# Patient Record
Sex: FEMALE | Race: WHITE | Employment: FULL TIME | URBAN - METROPOLITAN AREA
[De-identification: names, ages, dates, MRNs, and addresses within clinical notes are randomized per-mention and may not be internally consistent; named-entity substitution may affect disease eponyms.]

---

## 2017-02-08 ENCOUNTER — LAB ENCOUNTER (OUTPATIENT)
Dept: LAB | Age: 42
End: 2017-02-08
Attending: FAMILY MEDICINE
Payer: COMMERCIAL

## 2017-02-08 ENCOUNTER — OFFICE VISIT (OUTPATIENT)
Dept: FAMILY MEDICINE CLINIC | Facility: CLINIC | Age: 42
End: 2017-02-08

## 2017-02-08 VITALS
HEART RATE: 80 BPM | TEMPERATURE: 99 F | BODY MASS INDEX: 32.69 KG/M2 | WEIGHT: 186.81 LBS | HEIGHT: 63.5 IN | SYSTOLIC BLOOD PRESSURE: 118 MMHG | DIASTOLIC BLOOD PRESSURE: 80 MMHG | RESPIRATION RATE: 16 BRPM

## 2017-02-08 DIAGNOSIS — R10.2 PELVIC PAIN: ICD-10-CM

## 2017-02-08 DIAGNOSIS — E87.6 HYPOKALEMIA: Primary | ICD-10-CM

## 2017-02-08 DIAGNOSIS — Z00.00 ANNUAL PHYSICAL EXAM: Primary | ICD-10-CM

## 2017-02-08 DIAGNOSIS — B37.9 YEAST INFECTION: ICD-10-CM

## 2017-02-08 DIAGNOSIS — I10 BENIGN ESSENTIAL HYPERTENSION: ICD-10-CM

## 2017-02-08 DIAGNOSIS — E78.00 PURE HYPERCHOLESTEROLEMIA: ICD-10-CM

## 2017-02-08 PROBLEM — D25.9 UTERINE FIBROID: Status: ACTIVE | Noted: 2017-02-08

## 2017-02-08 LAB
ALBUMIN SERPL-MCNC: 3.6 G/DL (ref 3.5–4.8)
ALP LIVER SERPL-CCNC: 60 U/L (ref 37–98)
ALT SERPL-CCNC: 18 U/L (ref 14–54)
AST SERPL-CCNC: 16 U/L (ref 15–41)
BASOPHILS # BLD AUTO: 0.03 X10(3) UL (ref 0–0.1)
BASOPHILS NFR BLD AUTO: 0.7 %
BILIRUB SERPL-MCNC: 0.4 MG/DL (ref 0.1–2)
BILIRUB UR QL STRIP.AUTO: NEGATIVE
BUN BLD-MCNC: 15 MG/DL (ref 8–20)
CALCIUM BLD-MCNC: 8.3 MG/DL (ref 8.3–10.3)
CHLORIDE: 100 MMOL/L (ref 101–111)
CHOLEST SMN-MCNC: 160 MG/DL (ref ?–200)
CK: 136 IU/L (ref 26–192)
CLARITY UR REFRACT.AUTO: CLEAR
CO2: 31 MMOL/L (ref 22–32)
COLOR UR AUTO: YELLOW
CREAT BLD-MCNC: 0.76 MG/DL (ref 0.55–1.02)
EOSINOPHIL # BLD AUTO: 0.16 X10(3) UL (ref 0–0.3)
EOSINOPHIL NFR BLD AUTO: 3.8 %
ERYTHROCYTE [DISTWIDTH] IN BLOOD BY AUTOMATED COUNT: 11.9 % (ref 11.5–16)
GLUCOSE BLD-MCNC: 87 MG/DL (ref 70–99)
GLUCOSE UR STRIP.AUTO-MCNC: NEGATIVE MG/DL
HCT VFR BLD AUTO: 40.9 % (ref 34–50)
HDLC SERPL-MCNC: 69 MG/DL (ref 45–?)
HDLC SERPL: 2.32 {RATIO} (ref ?–4.44)
HGB BLD-MCNC: 13.5 G/DL (ref 12–16)
IMMATURE GRANULOCYTE COUNT: 0 X10(3) UL (ref 0–1)
IMMATURE GRANULOCYTE RATIO %: 0 %
KETONES UR STRIP.AUTO-MCNC: NEGATIVE MG/DL
LDLC SERPL CALC-MCNC: 80 MG/DL (ref ?–130)
LEUKOCYTE ESTERASE UR QL STRIP.AUTO: NEGATIVE
LYMPHOCYTES # BLD AUTO: 1.44 X10(3) UL (ref 0.9–4)
LYMPHOCYTES NFR BLD AUTO: 34.6 %
M PROTEIN MFR SERPL ELPH: 7 G/DL (ref 6.1–8.3)
MCH RBC QN AUTO: 31 PG (ref 27–33.2)
MCHC RBC AUTO-ENTMCNC: 33 G/DL (ref 31–37)
MCV RBC AUTO: 94 FL (ref 81–100)
MONOCYTES # BLD AUTO: 0.32 X10(3) UL (ref 0.1–0.6)
MONOCYTES NFR BLD AUTO: 7.7 %
NEUTROPHIL ABS PRELIM: 2.21 X10 (3) UL (ref 1.3–6.7)
NEUTROPHILS # BLD AUTO: 2.21 X10(3) UL (ref 1.3–6.7)
NEUTROPHILS NFR BLD AUTO: 53.2 %
NITRITE UR QL STRIP.AUTO: NEGATIVE
NONHDLC SERPL-MCNC: 91 MG/DL (ref ?–130)
PH UR STRIP.AUTO: 6 [PH] (ref 4.5–8)
PLATELET # BLD AUTO: 277 10(3)UL (ref 150–450)
POTASSIUM SERPL-SCNC: 3.1 MMOL/L (ref 3.6–5.1)
PROT UR STRIP.AUTO-MCNC: NEGATIVE MG/DL
RBC # BLD AUTO: 4.35 X10(6)UL (ref 3.8–5.1)
RED CELL DISTRIBUTION WIDTH-SD: 41.1 FL (ref 35.1–46.3)
SODIUM SERPL-SCNC: 137 MMOL/L (ref 136–144)
SP GR UR STRIP.AUTO: 1.01 (ref 1–1.03)
TRIGLYCERIDES: 55 MG/DL (ref ?–150)
TSI SER-ACNC: 2.94 MIU/ML (ref 0.35–5.5)
UROBILINOGEN UR STRIP.AUTO-MCNC: <2 MG/DL
VLDL: 11 MG/DL (ref 5–40)
WBC # BLD AUTO: 4.2 X10(3) UL (ref 4–13)

## 2017-02-08 PROCEDURE — 80061 LIPID PANEL: CPT

## 2017-02-08 PROCEDURE — 82550 ASSAY OF CK (CPK): CPT

## 2017-02-08 PROCEDURE — G0101 CA SCREEN;PELVIC/BREAST EXAM: HCPCS | Performed by: FAMILY MEDICINE

## 2017-02-08 PROCEDURE — 81001 URINALYSIS AUTO W/SCOPE: CPT

## 2017-02-08 PROCEDURE — 85025 COMPLETE CBC W/AUTO DIFF WBC: CPT

## 2017-02-08 PROCEDURE — 99396 PREV VISIT EST AGE 40-64: CPT | Performed by: FAMILY MEDICINE

## 2017-02-08 PROCEDURE — 80053 COMPREHEN METABOLIC PANEL: CPT

## 2017-02-08 PROCEDURE — 84443 ASSAY THYROID STIM HORMONE: CPT

## 2017-02-08 PROCEDURE — 99213 OFFICE O/P EST LOW 20 MIN: CPT | Performed by: FAMILY MEDICINE

## 2017-02-08 NOTE — PROGRESS NOTES
CC: Annual Physical Exam    HPI:   Florinda Cortez is a 42year old female who presents for a complete physical exam. Symptoms: has significant cramping, has significant bloating, does not get menses.  Patient notes hx uterine ablation for heavy cycles  Pt wi Cigarettes      Quit date: 02/01/2004    Alcohol Use: Yes           0.0 oz/week       0 Standard drinks or equivalent per week       Comment: 1 drink -1-2 times per wk-  wine or beer    Drug Use: No            Other Topics            Concern  Caffeine Conc °F (36.9 °C) (Tympanic)  Resp 16  Ht 63.5\"  Wt 186 lb 13 oz  BMI 32.57 kg/m2 Estimated body mass index is 32.57 kg/(m^2) as calculated from the following:    Height as of this encounter: 63.5\". Weight as of this encounter: 186 lb 13 oz.    Vital signs Metabolic Panel (14) [E]  CK (Creatine Kinase) (Not Creatinine) (E)  Lipid Panel [E]  TSH W Reflex To Free T4 [E]  UA/M With Culture Reflex [E]    Patient Instructions   Fasting labs today    rec pelvic ultrasound to eval fibroids and pelvic pain    rec Mo

## 2017-02-08 NOTE — PATIENT INSTRUCTIONS
Fasting labs today    rec pelvic ultrasound to eval fibroids and pelvic pain    rec Monistat- dual pack    Continue meds    Continue mammogram f.u

## 2017-02-09 ENCOUNTER — TELEPHONE (OUTPATIENT)
Dept: FAMILY MEDICINE CLINIC | Facility: CLINIC | Age: 42
End: 2017-02-09

## 2017-02-09 RX ORDER — POTASSIUM CHLORIDE 1500 MG/1
1 TABLET, FILM COATED, EXTENDED RELEASE ORAL DAILY
Qty: 60 TABLET | Refills: 0 | Status: SHIPPED | OUTPATIENT
Start: 2017-02-09 | End: 2017-04-11

## 2017-02-09 NOTE — TELEPHONE ENCOUNTER
----- Message from Yasmeen Machado MD sent at 2/8/2017  9:26 PM CST -----  Labs reviewed  All appear normal with exception of LOW POTASIUM> Pt on HCTZ which can cause this.  I rec she start taking KCL suppliment daily and recheck basic chem panel in 4-6

## 2017-02-14 ENCOUNTER — TELEPHONE (OUTPATIENT)
Dept: FAMILY MEDICINE CLINIC | Facility: CLINIC | Age: 42
End: 2017-02-14

## 2017-02-14 ENCOUNTER — HOSPITAL ENCOUNTER (OUTPATIENT)
Dept: ULTRASOUND IMAGING | Age: 42
Discharge: HOME | End: 2017-02-14
Attending: FAMILY MEDICINE
Payer: COMMERCIAL

## 2017-02-14 DIAGNOSIS — E78.00 PURE HYPERCHOLESTEROLEMIA: ICD-10-CM

## 2017-02-14 DIAGNOSIS — R10.2 PELVIC PAIN: ICD-10-CM

## 2017-02-14 PROCEDURE — 76856 US EXAM PELVIC COMPLETE: CPT

## 2017-02-14 PROCEDURE — 76830 TRANSVAGINAL US NON-OB: CPT

## 2017-02-14 NOTE — TELEPHONE ENCOUNTER
----- Message from Merlinda Deacon, MD sent at 2/14/2017  4:33 PM CST -----  Reviewed. Uterine fibroid noted. Pt rec to consult with gyne if pelvic pain persists. Endometrial lining very thin s/p ablation.

## 2017-02-25 RX ORDER — ATORVASTATIN CALCIUM 10 MG/1
TABLET, FILM COATED ORAL
Qty: 90 TABLET | Refills: 3 | Status: SHIPPED | OUTPATIENT
Start: 2017-02-25 | End: 2018-03-11

## 2017-04-10 ENCOUNTER — TELEPHONE (OUTPATIENT)
Dept: FAMILY MEDICINE CLINIC | Facility: CLINIC | Age: 42
End: 2017-04-10

## 2017-04-10 DIAGNOSIS — E87.6 HYPOKALEMIA: Primary | ICD-10-CM

## 2017-04-11 NOTE — TELEPHONE ENCOUNTER
Pt had labs done on 2/8- had low Potassium level. Pt was started on supplement at that time-RX was issued. Pt states she will be out of Potassium on Friday- is scheduled for labs on 4/19. Pt was asked to repeat chem in 4-6 wks.   Will need lab order pleas

## 2017-04-12 RX ORDER — POTASSIUM CHLORIDE 1500 MG/1
1 TABLET, FILM COATED, EXTENDED RELEASE ORAL DAILY
Qty: 60 TABLET | Refills: 0 | Status: SHIPPED | OUTPATIENT
Start: 2017-04-12 | End: 2017-06-11

## 2017-04-19 ENCOUNTER — APPOINTMENT (OUTPATIENT)
Dept: LAB | Age: 42
End: 2017-04-19
Attending: FAMILY MEDICINE
Payer: COMMERCIAL

## 2017-04-19 DIAGNOSIS — E87.6 HYPOKALEMIA: ICD-10-CM

## 2017-04-19 PROCEDURE — 80048 BASIC METABOLIC PNL TOTAL CA: CPT | Performed by: FAMILY MEDICINE

## 2017-06-12 RX ORDER — HYDROCHLOROTHIAZIDE 25 MG/1
TABLET ORAL
Qty: 90 TABLET | Refills: 2 | Status: SHIPPED | OUTPATIENT
Start: 2017-06-12 | End: 2018-04-10

## 2017-06-12 RX ORDER — POTASSIUM CHLORIDE 1500 MG/1
TABLET, FILM COATED, EXTENDED RELEASE ORAL
Qty: 60 TABLET | Refills: 2 | Status: SHIPPED | OUTPATIENT
Start: 2017-06-12 | End: 2018-02-05

## 2017-10-18 ENCOUNTER — OFFICE VISIT (OUTPATIENT)
Dept: FAMILY MEDICINE CLINIC | Facility: CLINIC | Age: 42
End: 2017-10-18

## 2017-10-18 VITALS
TEMPERATURE: 98 F | WEIGHT: 184.5 LBS | DIASTOLIC BLOOD PRESSURE: 82 MMHG | BODY MASS INDEX: 32.29 KG/M2 | HEART RATE: 64 BPM | HEIGHT: 63.5 IN | RESPIRATION RATE: 16 BRPM | SYSTOLIC BLOOD PRESSURE: 134 MMHG

## 2017-10-18 DIAGNOSIS — I10 BENIGN ESSENTIAL HYPERTENSION: ICD-10-CM

## 2017-10-18 DIAGNOSIS — Z00.00 ANNUAL PHYSICAL EXAM: Primary | ICD-10-CM

## 2017-10-18 DIAGNOSIS — E78.00 PURE HYPERCHOLESTEROLEMIA: ICD-10-CM

## 2017-10-18 PROBLEM — E87.6 HYPOKALEMIA: Status: ACTIVE | Noted: 2017-10-18

## 2017-10-18 PROCEDURE — 99396 PREV VISIT EST AGE 40-64: CPT | Performed by: FAMILY MEDICINE

## 2017-10-18 NOTE — PROGRESS NOTES
CC: Annual Physical Exam    HPI:   Nelia Howell is a 36year old female who presents for a complete physical exam.    . Patient here for her annual physical she is generally healthy and doing well her weight is down a pound and a half or so.   Patient went LUMPECTOMY LEFT   Family History   Problem Relation Age of Onset   • Cancer Father 39     breast cancer   • Hypertension Mother       Social History:   Social History    Marital status:              Spouse name:                       Years of educat abdominal pain, nausea, vomiting, constipation, diarrhea, or blood in stool. MUSCULOSKELETAL:  Denies weakness, muscle aches, back pain, joint pain, swelling or stiffness.   NEUROLOGICAL:  Denies headache, seizures, dizziness, syncope, paralysis, ataxia, n no hepatosplenomegaly. BACK: No tenderness, no spasm, SLR test negative, FROM. : Deferred   EXTREMITIES:  No edema, no cyanosis, no clubbing, FROM, 2+ dorsalis pedis pulses bilaterally.   NEURO:  No deficit, normal gait, strength and tone, sensory intac

## 2017-10-18 NOTE — PATIENT INSTRUCTIONS
Flu vaccine as planned next week    Continue meds    Return for fasting labs    Mammogram    Home stool screen

## 2017-10-29 PROCEDURE — 82272 OCCULT BLD FECES 1-3 TESTS: CPT | Performed by: FAMILY MEDICINE

## 2017-10-31 ENCOUNTER — LABORATORY ENCOUNTER (OUTPATIENT)
Dept: LAB | Age: 42
End: 2017-10-31
Attending: FAMILY MEDICINE
Payer: COMMERCIAL

## 2017-10-31 DIAGNOSIS — I10 BENIGN ESSENTIAL HYPERTENSION: ICD-10-CM

## 2017-10-31 DIAGNOSIS — E78.00 PURE HYPERCHOLESTEROLEMIA: ICD-10-CM

## 2017-10-31 PROCEDURE — 80061 LIPID PANEL: CPT | Performed by: FAMILY MEDICINE

## 2017-10-31 PROCEDURE — 36415 COLL VENOUS BLD VENIPUNCTURE: CPT | Performed by: FAMILY MEDICINE

## 2017-10-31 PROCEDURE — 80053 COMPREHEN METABOLIC PANEL: CPT | Performed by: FAMILY MEDICINE

## 2017-11-01 ENCOUNTER — TELEPHONE (OUTPATIENT)
Dept: FAMILY MEDICINE CLINIC | Facility: CLINIC | Age: 42
End: 2017-11-01

## 2017-11-01 DIAGNOSIS — E87.6 HYPOKALEMIA: Primary | ICD-10-CM

## 2017-11-01 NOTE — TELEPHONE ENCOUNTER
Pt informed, pt has been forgetting to take her Potassium, is missing 1-2 doses per week. Pt states she will do better, and would like to proceed with plan to repeat chem in one month. Order for repeat started.

## 2017-11-01 NOTE — TELEPHONE ENCOUNTER
----- Message from Bartolo Henry MD sent at 10/31/2017  8:29 PM CDT -----  Labs with low potassium, lipids ok.  Please check if pt taking potassium prescription- if so will need to increse to 40 meq, if not taking- should do so, recheck basic chem pane

## 2017-11-14 ENCOUNTER — TELEPHONE (OUTPATIENT)
Dept: FAMILY MEDICINE CLINIC | Facility: CLINIC | Age: 42
End: 2017-11-14

## 2017-11-14 DIAGNOSIS — Z00.00 ANNUAL PHYSICAL EXAM: Primary | ICD-10-CM

## 2018-01-09 ENCOUNTER — APPOINTMENT (OUTPATIENT)
Dept: LAB | Age: 43
End: 2018-01-09
Attending: FAMILY MEDICINE
Payer: COMMERCIAL

## 2018-01-09 DIAGNOSIS — E87.6 HYPOKALEMIA: ICD-10-CM

## 2018-01-09 LAB
ALBUMIN SERPL-MCNC: 3.7 G/DL (ref 3.5–4.8)
ALP LIVER SERPL-CCNC: 64 U/L (ref 37–98)
ALT SERPL-CCNC: 29 U/L (ref 14–54)
AST SERPL-CCNC: 20 U/L (ref 15–41)
BILIRUB SERPL-MCNC: 0.5 MG/DL (ref 0.1–2)
BUN BLD-MCNC: 17 MG/DL (ref 8–20)
CALCIUM BLD-MCNC: 8.9 MG/DL (ref 8.3–10.3)
CHLORIDE: 104 MMOL/L (ref 101–111)
CO2: 29 MMOL/L (ref 22–32)
CREAT BLD-MCNC: 0.76 MG/DL (ref 0.55–1.02)
GLUCOSE BLD-MCNC: 81 MG/DL (ref 70–99)
M PROTEIN MFR SERPL ELPH: 7.4 G/DL (ref 6.1–8.3)
POTASSIUM SERPL-SCNC: 4 MMOL/L (ref 3.6–5.1)
SODIUM SERPL-SCNC: 139 MMOL/L (ref 136–144)

## 2018-01-09 PROCEDURE — 80053 COMPREHEN METABOLIC PANEL: CPT | Performed by: FAMILY MEDICINE

## 2018-01-09 PROCEDURE — 36415 COLL VENOUS BLD VENIPUNCTURE: CPT | Performed by: FAMILY MEDICINE

## 2018-01-10 DIAGNOSIS — E87.6 HYPOKALEMIA: ICD-10-CM

## 2018-01-10 DIAGNOSIS — E78.5 HYPERLIPIDEMIA, UNSPECIFIED HYPERLIPIDEMIA TYPE: Primary | ICD-10-CM

## 2018-01-17 ENCOUNTER — TELEPHONE (OUTPATIENT)
Dept: FAMILY MEDICINE CLINIC | Facility: CLINIC | Age: 43
End: 2018-01-17

## 2018-01-17 NOTE — TELEPHONE ENCOUNTER
Pt fell on ice Saturday, pt states she slipped and fell onto her R side. Pt states she has no visible swelling or bruising but  C/o shooting pain on right side with certain movement.   Pt was already scheduled for appt to seen NP early in AM.  Call routed

## 2018-01-17 NOTE — TELEPHONE ENCOUNTER
Pt called stating that she recently fell on ice and now has shooting pain going down her legs. Pt schedulded to see Paola on 1/18.  Call triaged to Shahriar Garcia

## 2018-01-18 ENCOUNTER — HOSPITAL ENCOUNTER (OUTPATIENT)
Dept: GENERAL RADIOLOGY | Age: 43
Discharge: HOME | End: 2018-01-18
Attending: NURSE PRACTITIONER
Payer: COMMERCIAL

## 2018-01-18 ENCOUNTER — OFFICE VISIT (OUTPATIENT)
Dept: FAMILY MEDICINE CLINIC | Facility: CLINIC | Age: 43
End: 2018-01-18

## 2018-01-18 VITALS
HEART RATE: 84 BPM | WEIGHT: 200.19 LBS | SYSTOLIC BLOOD PRESSURE: 122 MMHG | RESPIRATION RATE: 14 BRPM | TEMPERATURE: 99 F | OXYGEN SATURATION: 98 % | DIASTOLIC BLOOD PRESSURE: 70 MMHG | HEIGHT: 63.5 IN | BODY MASS INDEX: 35.03 KG/M2
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
## 2018-01-18 DIAGNOSIS — M79.604 LOWER LIMB PAIN, LATERAL, RIGHT: ICD-10-CM
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
## 2018-01-18 PROCEDURE — 99214 OFFICE O/P EST MOD 30 MIN: CPT | Performed by: NURSE PRACTITIONER

## 2018-01-18 PROCEDURE — 73502 X-RAY EXAM HIP UNI 2-3 VIEWS: CPT | Performed by: NURSE PRACTITIONER

## 2018-01-18 PROCEDURE — 73552 X-RAY EXAM OF FEMUR 2/>: CPT | Performed by: NURSE PRACTITIONER

## 2018-01-18 RX ORDER — NAPROXEN 500 MG/1
500 TABLET ORAL 2 TIMES DAILY WITH MEALS
Qty: 30 TABLET | Refills: 0 | Status: SHIPPED | OUTPATIENT
Start: 2018-01-18 | End: 2018-04-18 | Stop reason: ALTCHOICE

## 2018-01-18 NOTE — PROGRESS NOTES
[address]  PROGRESS NOTE  Thompson Tovar is a 36year old female.     Chief Complaint:  Patient presents with:  Fall: Pain due to fall on the ice    HPI:   Patient presents to office visit with complaint of lateral right leg pain after sh TABLET BY MOUTH DAILY Disp: 60 tablet Rfl: 2   ATORVASTATIN CALCIUM 10 MG Oral Tab TAKE 1 TABLET BY MOUTH EVERY AT BEDTIME Disp: 90 tablet Rfl: 3   cetirizine (ZYRTEC ALLERGY) 10 MG Oral Tab Take 10 mg by mouth daily.  Seasonal  prn  Disp:  Rfl:    Multiple diagnosis)  Lower limb pain, lateral, right      PLAN: Meds as below.   Comfort care instructions as listed in Patient Instructions    Meds & Refills for this Visit:    No prescriptions requested or ordered in this encounter    Risks, benefits, side effects

## 2018-01-18 NOTE — PATIENT INSTRUCTIONS
X-ray of right hip and right femur done today. Rest area and elevate as needed  Recommend heat or ice therapy as needed  Take naproxen as prescribed, take with food.   Do not mix with Advil, ibuprofen, Motrin, Zondra Hammans doing so can increase risk of GI bleed 9/3/2015  © 6636-2214 [employer]. 140[address] [facility], [address] [place] [address]. All rights reserved. This information is not intended as a substitute for professional medical care. Always follow your healthcare professional's instructions.

## 2018-02-06 RX ORDER — POTASSIUM CHLORIDE 1500 MG/1
TABLET, FILM COATED, EXTENDED RELEASE ORAL
Qty: 60 TABLET | Refills: 0 | Status: SHIPPED | OUTPATIENT
Start: 2018-02-06 | End: 2018-04-23

## 2018-02-06 NOTE — TELEPHONE ENCOUNTER
Future appt:    Last Appointment:  10/18/2017- pt asked to return in 6 months and prn  RX last refilled:  6/12/17 for #60 (pt takes one daily) and 2 refills    Cholesterol, Total (mg/dL)   Date Value   10/31/2017 152   ----------  HDL Cholesterol (mg/dL)

## 2018-03-12 RX ORDER — ATORVASTATIN CALCIUM 10 MG/1
TABLET, FILM COATED ORAL
Qty: 90 TABLET | Refills: 0 | Status: SHIPPED | OUTPATIENT
Start: 2018-03-12 | End: 2018-07-11

## 2018-03-12 NOTE — TELEPHONE ENCOUNTER
Future appt:    Last Appointment:  10/18/2017- pt instructed to return in 6 months  Atorvastatin last refilled : 2/25/17 for one year    Pt is scheduled for her appt's in April. Pt has 2 wks of medication left.     Future Appointments  Date Time Provider Laura Hogan

## 2018-04-10 RX ORDER — HYDROCHLOROTHIAZIDE 25 MG/1
TABLET ORAL
Qty: 90 TABLET | Refills: 0 | Status: SHIPPED | OUTPATIENT
Start: 2018-04-10 | End: 2018-07-11

## 2018-04-10 NOTE — TELEPHONE ENCOUNTER
Future appt:     Your appointments     Date & Time Appointment Department [facility])    Apr 13, 2018  8:15 AM CDT Laboratory Visit with REF Nilesh [facility]ley [facility] ([facility] Ref [facility])    Apr 18, 2018  8:00 AM CDT Follow up - Extended with Lillie Elias

## 2018-04-13 ENCOUNTER — APPOINTMENT (OUTPATIENT)
Dept: LAB | Age: 43
End: 2018-04-13
Attending: FAMILY MEDICINE
Payer: COMMERCIAL

## 2018-04-13 DIAGNOSIS — E87.6 HYPOKALEMIA: ICD-10-CM

## 2018-04-13 DIAGNOSIS — E78.5 HYPERLIPIDEMIA, UNSPECIFIED HYPERLIPIDEMIA TYPE: ICD-10-CM

## 2018-04-13 PROCEDURE — 80061 LIPID PANEL: CPT | Performed by: FAMILY MEDICINE

## 2018-04-13 PROCEDURE — 36415 COLL VENOUS BLD VENIPUNCTURE: CPT | Performed by: FAMILY MEDICINE

## 2018-04-13 PROCEDURE — 80053 COMPREHEN METABOLIC PANEL: CPT | Performed by: FAMILY MEDICINE

## 2018-04-14 ENCOUNTER — TELEPHONE (OUTPATIENT)
Dept: FAMILY MEDICINE CLINIC | Facility: CLINIC | Age: 43
End: 2018-04-14

## 2018-04-14 NOTE — TELEPHONE ENCOUNTER
----- Message from Melo Campbell MD sent at 4/14/2018  8:46 AM CDT -----  Labs reviewed. Lipids - normal  Chem panel- slight low potassium, otherwise normal  Pt should increase K to 40 meq a day.   Appt 4/18/18 for rebeccau

## 2018-04-14 NOTE — TELEPHONE ENCOUNTER
----- Message from Sydney Resendiz MD sent at 4/14/2018  8:46 AM CDT -----  Labs reviewed. Lipids - normal  Chem panel- slight low potassium, otherwise normal  Pt should increase K to 40 meq a day.   Appt 4/18/18 for fMunau

## 2018-04-18 ENCOUNTER — LAB ENCOUNTER (OUTPATIENT)
Dept: LAB | Age: 43
End: 2018-04-18
Attending: FAMILY MEDICINE
Payer: COMMERCIAL

## 2018-04-18 ENCOUNTER — OFFICE VISIT (OUTPATIENT)
Dept: FAMILY MEDICINE CLINIC | Facility: CLINIC | Age: 43
End: 2018-04-18

## 2018-04-18 VITALS
WEIGHT: 204 LBS | SYSTOLIC BLOOD PRESSURE: 116 MMHG | BODY MASS INDEX: 35.7 KG/M2 | DIASTOLIC BLOOD PRESSURE: 70 MMHG | HEART RATE: 80 BPM | TEMPERATURE: 99 F | RESPIRATION RATE: 16 BRPM | HEIGHT: 63.5 IN | OXYGEN SATURATION: 98 %

## 2018-04-18 DIAGNOSIS — E87.6 HYPOKALEMIA: ICD-10-CM

## 2018-04-18 DIAGNOSIS — R53.83 FATIGUE, UNSPECIFIED TYPE: ICD-10-CM

## 2018-04-18 DIAGNOSIS — E78.00 PURE HYPERCHOLESTEROLEMIA: ICD-10-CM

## 2018-04-18 DIAGNOSIS — I10 BENIGN ESSENTIAL HYPERTENSION: Primary | ICD-10-CM

## 2018-04-18 PROCEDURE — 83550 IRON BINDING TEST: CPT | Performed by: FAMILY MEDICINE

## 2018-04-18 PROCEDURE — 83540 ASSAY OF IRON: CPT | Performed by: FAMILY MEDICINE

## 2018-04-18 PROCEDURE — 85025 COMPLETE CBC W/AUTO DIFF WBC: CPT | Performed by: FAMILY MEDICINE

## 2018-04-18 PROCEDURE — 82607 VITAMIN B-12: CPT | Performed by: FAMILY MEDICINE

## 2018-04-18 PROCEDURE — 99214 OFFICE O/P EST MOD 30 MIN: CPT | Performed by: FAMILY MEDICINE

## 2018-04-18 PROCEDURE — 36415 COLL VENOUS BLD VENIPUNCTURE: CPT | Performed by: FAMILY MEDICINE

## 2018-04-18 PROCEDURE — 84443 ASSAY THYROID STIM HORMONE: CPT | Performed by: FAMILY MEDICINE

## 2018-04-18 PROCEDURE — 82728 ASSAY OF FERRITIN: CPT | Performed by: FAMILY MEDICINE

## 2018-04-18 NOTE — PROGRESS NOTES
[facility] [place]  PROGRESS NOTE  Chief Complaint:   Patient presents with:  Medication Follow-Up      HPI:   This is a 39year old female coming in for general medical follow-up. Patient with history of hypertension hyperlipidemia.   Patient w 111 mmol/L   CO2 31.0 22.0 - 32.0 mmol/L   -LIPID PANEL   Result Value Ref Range   Cholesterol, Total 155 <200 mg/dL   Triglycerides 87 <150 mg/dL   HDL Cholesterol 65 >45 mg/dL   LDL Cholesterol 73 <130 mg/dL   VLDL 17 5 - 40 mg/dL   Chol/HDL Ratio 2.38 < Prescriptions:  HYDROCHLOROTHIAZIDE 25 MG Oral Tab TAKE 1 TABLET BY MOUTH EVERY DAY Disp: 90 tablet Rfl: 0   ATORVASTATIN 10 MG Oral Tab TAKE 1 TABLET BY MOUTH EVERY AT BEDTIME Disp: 90 tablet Rfl: 0   POTASSIUM CHLORIDE ER 20 MEQ Oral Tab CR TAKE 1 TABLET body mass index is 35.57 kg/m² as calculated from the following:    Height as of this encounter: 63.5\". Weight as of this encounter: 204 lb. Vital signs reviewed. Appears stated age, well groomed.   Physical Exam:  GEN:  Patient is alert, awake and rosy prescriptions requested or ordered in this encounter    Health Maintenance:        Lizandro Hgaen MD  4/18/2018  8:23 AM    Patient/Caregiver Education: Patient/Caregiver Education: There are no barriers to learning. Medical education done.    Outcome:

## 2018-04-18 NOTE — PATIENT INSTRUCTIONS
rec check cbc, iron and tsh today     Encourage exercise and streching    Increase K for a week- 40 meq a day for 7 day  Then return 20 meq a day    Continue other meds    Recheck  6 month with physical - sooner if concerns    Encourage weight loss

## 2018-04-19 ENCOUNTER — TELEPHONE (OUTPATIENT)
Dept: FAMILY MEDICINE CLINIC | Facility: CLINIC | Age: 43
End: 2018-04-19

## 2018-04-19 NOTE — TELEPHONE ENCOUNTER
----- Message from Domingo Ruth MD sent at 4/19/2018  8:41 AM CDT -----  Labs reviewed. Iron panel normal.   B12 level- normal  Cbc-normal, thyroid normal  Labs are reassuring. I encourage pt to exercise 3-5 times a week for 30 min.   Continue K sup

## 2018-04-24 RX ORDER — POTASSIUM CHLORIDE 1500 MG/1
TABLET, EXTENDED RELEASE ORAL
Qty: 180 TABLET | Refills: 1 | Status: SHIPPED | OUTPATIENT
Start: 2018-04-24 | End: 2019-04-29

## 2018-04-24 NOTE — TELEPHONE ENCOUNTER
RF request from [employer], [address] for Potassium 20 MEQ #60. Please advise. Future appt:    Last Appointment:  4/18/2018; Recheck  6 month with physical - sooner if concerns    Cholesterol, Total (mg/dL)   Date Value   04/13/2018 155   ----------  HDL Cholesterol (mg/dL)   Date Value   04/13/2018 65   ----------  LDL Cholesterol (mg/dL)   Date Value   04/13/2018 73   ----------  Triglycerides (mg/dL)   Date Value   04/13/2018 87   ----------  No results found for: EAG, A1C    Lab Results  Component Value Date   TSH 4.540 04/18/2018     Last RF:  2/6/2018    No Follow-up on file.

## 2018-07-11 RX ORDER — ATORVASTATIN CALCIUM 10 MG/1
TABLET, FILM COATED ORAL
Qty: 90 TABLET | Refills: 0 | Status: SHIPPED | OUTPATIENT
Start: 2018-07-11 | End: 2019-01-13

## 2018-07-11 RX ORDER — HYDROCHLOROTHIAZIDE 25 MG/1
TABLET ORAL
Qty: 90 TABLET | Refills: 0 | Status: SHIPPED | OUTPATIENT
Start: 2018-07-11 | End: 2018-11-11

## 2018-07-11 NOTE — TELEPHONE ENCOUNTER
Future appt:    Last Appointment:  4/18/2018; Recheck  6 month with physical    Cholesterol, Total (mg/dL)   Date Value   04/13/2018 155   ----------  HDL Cholesterol (mg/dL)   Date Value   04/13/2018 65   ----------  LDL Cholesterol (mg/dL)   Date Value

## 2018-08-16 ENCOUNTER — OFFICE VISIT (OUTPATIENT)
Dept: FAMILY MEDICINE CLINIC | Facility: CLINIC | Age: 43
End: 2018-08-16
Payer: COMMERCIAL

## 2018-08-16 VITALS
RESPIRATION RATE: 16 BRPM | BODY MASS INDEX: 35.24 KG/M2 | SYSTOLIC BLOOD PRESSURE: 128 MMHG | WEIGHT: 201.38 LBS | TEMPERATURE: 99 F | HEIGHT: 63.5 IN | DIASTOLIC BLOOD PRESSURE: 88 MMHG | HEART RATE: 80 BPM

## 2018-08-16 DIAGNOSIS — R07.9 CHEST PAIN, UNSPECIFIED TYPE: Primary | ICD-10-CM

## 2018-08-16 DIAGNOSIS — R13.19 ESOPHAGEAL DYSPHAGIA: ICD-10-CM

## 2018-08-16 PROCEDURE — 93000 ELECTROCARDIOGRAM COMPLETE: CPT | Performed by: FAMILY MEDICINE

## 2018-08-16 PROCEDURE — 99214 OFFICE O/P EST MOD 30 MIN: CPT | Performed by: FAMILY MEDICINE

## 2018-08-16 RX ORDER — OMEPRAZOLE 20 MG/1
20 CAPSULE, DELAYED RELEASE ORAL
Qty: 30 CAPSULE | Refills: 0 | Status: SHIPPED
Start: 2018-08-16 | End: 2018-09-15

## 2018-08-16 NOTE — PROGRESS NOTES
[facility] [place]  PROGRESS NOTE  Chief Complaint:   Patient presents with:  Chest Pressure: chest discomfort while eating, started a month ago      HPI:   This is a 39year old female coming in for anterior chest pain and difficulty with swall 0.10 x10(3) uL   Immature Granulocyte Absolute 0.01 0.00 - 1.00 x10(3) uL   Neutrophil % 62.7 %   Lymphocyte % 25.5 %   Monocyte % 7.9 %   Eosinophil % 3.2 %   Basophil % 0.5 %   Immature Granulocyte % 0.2 %       Past Medical History:   Diagnosis Date   • palpitations, edema, dyspnea on exertion or at rest.  RESPIRATORY:  Denies shortness of breath, wheezing, cough or sputum. GASTROINTESTINAL: See HPI  MUSCULOSKELETAL:  Denies weakness, muscle aches, back pain, joint pain, swelling or stiffness.   Vernon Thomas Recheck if symptoms not improving. Emergency room if symptoms worsen. [facility] Maintenance: There are no preventive care reminders to display for this patient.     Patient/Caregiver Education: Patient/Caregiver Education: There are no barriers to Junious Sin

## 2018-09-17 RX ORDER — OMEPRAZOLE 20 MG/1
CAPSULE, DELAYED RELEASE ORAL
Qty: 30 CAPSULE | Refills: 0 | Status: SHIPPED | OUTPATIENT
Start: 2018-09-17 | End: 2019-08-30

## 2018-09-17 NOTE — TELEPHONE ENCOUNTER
Future appt:    Last Appointment:  8/16/2018  Cholesterol, Total (mg/dL)   Date Value   04/13/2018 155     HDL Cholesterol (mg/dL)   Date Value   04/13/2018 65     LDL Cholesterol (mg/dL)   Date Value   04/13/2018 73     Triglycerides (mg/dL)   Date Value

## 2018-11-12 RX ORDER — HYDROCHLOROTHIAZIDE 25 MG/1
TABLET ORAL
Qty: 90 TABLET | Refills: 1 | Status: SHIPPED | OUTPATIENT
Start: 2018-11-12 | End: 2019-06-04

## 2018-11-12 NOTE — TELEPHONE ENCOUNTER
RF request from [employer], [address] for HCTZ 25 MG #90. Please advise. Future appt:    Last Appointment:  4/18/2018; Recheck  6 month with physical - sooner if concerns    Cholesterol, Total (mg/dL)   Date Value   04/13/2018 155     HDL Cholesterol (mg/dL)   Date Value   04/13/2018 65     LDL Cholesterol (mg/dL)   Date Value   04/13/2018 73     Triglycerides (mg/dL)   Date Value   04/13/2018 87     No results found for: EAG, A1C  Lab Results   Component Value Date    TSH 4.540 04/18/2018     Last RF:  7/11/2018    No Follow-up on file.
no

## 2019-01-14 RX ORDER — ATORVASTATIN CALCIUM 10 MG/1
TABLET, FILM COATED ORAL
Qty: 90 TABLET | Refills: 0 | Status: SHIPPED | OUTPATIENT
Start: 2019-01-14 | End: 2019-08-30

## 2019-01-14 NOTE — TELEPHONE ENCOUNTER
Future appt:    Last Appointment:   8/16/18  Last annual exam:  10/18/17    Atorvastatin refilled back on 7/11/18 for #90      Pt informed, due for px- appt scheduled.      Future Appointments   Date Time Provider Jeronimo Keyes   2/20/2019  8:00 AM Carin

## 2019-02-20 ENCOUNTER — OFFICE VISIT (OUTPATIENT)
Dept: FAMILY MEDICINE CLINIC | Facility: CLINIC | Age: 44
End: 2019-02-20
Payer: COMMERCIAL

## 2019-02-20 ENCOUNTER — APPOINTMENT (OUTPATIENT)
Dept: LAB | Age: 44
End: 2019-02-20
Attending: FAMILY MEDICINE
Payer: COMMERCIAL

## 2019-02-20 VITALS
HEIGHT: 64 IN | DIASTOLIC BLOOD PRESSURE: 88 MMHG | HEART RATE: 84 BPM | WEIGHT: 187.38 LBS | TEMPERATURE: 99 F | SYSTOLIC BLOOD PRESSURE: 126 MMHG | RESPIRATION RATE: 12 BRPM | BODY MASS INDEX: 31.99 KG/M2

## 2019-02-20 DIAGNOSIS — Z91.018 FOOD ALLERGY: ICD-10-CM

## 2019-02-20 DIAGNOSIS — E78.00 PURE HYPERCHOLESTEROLEMIA: ICD-10-CM

## 2019-02-20 DIAGNOSIS — E87.6 HYPOKALEMIA: ICD-10-CM

## 2019-02-20 DIAGNOSIS — N60.12 FIBROCYSTIC DISEASE OF LEFT BREAST: ICD-10-CM

## 2019-02-20 DIAGNOSIS — D25.9 UTERINE LEIOMYOMA, UNSPECIFIED LOCATION: ICD-10-CM

## 2019-02-20 DIAGNOSIS — I10 BENIGN ESSENTIAL HYPERTENSION: ICD-10-CM

## 2019-02-20 DIAGNOSIS — J98.8 CONGESTION OF RESPIRATORY TRACT: ICD-10-CM

## 2019-02-20 DIAGNOSIS — Z00.00 ANNUAL PHYSICAL EXAM: Primary | ICD-10-CM

## 2019-02-20 LAB
ALBUMIN SERPL-MCNC: 3.7 G/DL (ref 3.4–5)
ALBUMIN/GLOB SERPL: 1.1 {RATIO} (ref 1–2)
ALP LIVER SERPL-CCNC: 69 U/L (ref 37–98)
ALT SERPL-CCNC: 27 U/L (ref 13–56)
ANION GAP SERPL CALC-SCNC: 6 MMOL/L (ref 0–18)
AST SERPL-CCNC: 24 U/L (ref 15–37)
BASOPHILS # BLD AUTO: 0.01 X10(3) UL (ref 0–0.2)
BASOPHILS NFR BLD AUTO: 0.3 %
BILIRUB SERPL-MCNC: 0.4 MG/DL (ref 0.1–2)
BILIRUB UR QL STRIP.AUTO: NEGATIVE
BUN BLD-MCNC: 14 MG/DL (ref 7–18)
BUN/CREAT SERPL: 18.4 (ref 10–20)
CALCIUM BLD-MCNC: 8.6 MG/DL (ref 8.5–10.1)
CHLORIDE SERPL-SCNC: 101 MMOL/L (ref 98–107)
CHOLEST SMN-MCNC: 196 MG/DL (ref ?–200)
CLARITY UR REFRACT.AUTO: CLEAR
CO2 SERPL-SCNC: 31 MMOL/L (ref 21–32)
COLOR UR AUTO: YELLOW
CREAT BLD-MCNC: 0.76 MG/DL (ref 0.55–1.02)
DEPRECATED RDW RBC AUTO: 41.5 FL (ref 35.1–46.3)
EOSINOPHIL # BLD AUTO: 0.09 X10(3) UL (ref 0–0.7)
EOSINOPHIL NFR BLD AUTO: 2.7 %
ERYTHROCYTE [DISTWIDTH] IN BLOOD BY AUTOMATED COUNT: 12 % (ref 11–15)
GLOBULIN PLAS-MCNC: 3.5 G/DL (ref 2.8–4.4)
GLUCOSE BLD-MCNC: 83 MG/DL (ref 70–99)
GLUCOSE UR STRIP.AUTO-MCNC: NEGATIVE MG/DL
HCT VFR BLD AUTO: 45.5 % (ref 35–48)
HDLC SERPL-MCNC: 58 MG/DL (ref 40–59)
HGB BLD-MCNC: 14.9 G/DL (ref 12–16)
IMM GRANULOCYTES # BLD AUTO: 0.01 X10(3) UL (ref 0–1)
IMM GRANULOCYTES NFR BLD: 0.3 %
KETONES UR STRIP.AUTO-MCNC: NEGATIVE MG/DL
LDLC SERPL CALC-MCNC: 121 MG/DL (ref ?–100)
LEUKOCYTE ESTERASE UR QL STRIP.AUTO: NEGATIVE
LYMPHOCYTES # BLD AUTO: 1.3 X10(3) UL (ref 1–4)
LYMPHOCYTES NFR BLD AUTO: 39.4 %
M PROTEIN MFR SERPL ELPH: 7.2 G/DL (ref 6.4–8.2)
MCH RBC QN AUTO: 30.6 PG (ref 26–34)
MCHC RBC AUTO-ENTMCNC: 32.7 G/DL (ref 31–37)
MCV RBC AUTO: 93.4 FL (ref 80–100)
MONOCYTES # BLD AUTO: 0.37 X10(3) UL (ref 0.1–1)
MONOCYTES NFR BLD AUTO: 11.2 %
NEUTROPHILS # BLD AUTO: 1.52 X10 (3) UL (ref 1.5–7.7)
NEUTROPHILS # BLD AUTO: 1.52 X10(3) UL (ref 1.5–7.7)
NEUTROPHILS NFR BLD AUTO: 46.1 %
NITRITE UR QL STRIP.AUTO: NEGATIVE
NONHDLC SERPL-MCNC: 138 MG/DL (ref ?–130)
OSMOLALITY SERPL CALC.SUM OF ELEC: 286 MOSM/KG (ref 275–295)
PH UR STRIP.AUTO: 7 [PH] (ref 4.5–8)
PLATELET # BLD AUTO: 280 10(3)UL (ref 150–450)
POTASSIUM SERPL-SCNC: 3.2 MMOL/L (ref 3.5–5.1)
PROT UR STRIP.AUTO-MCNC: NEGATIVE MG/DL
RBC # BLD AUTO: 4.87 X10(6)UL (ref 3.8–5.3)
SODIUM SERPL-SCNC: 138 MMOL/L (ref 136–145)
SP GR UR STRIP.AUTO: 1.01 (ref 1–1.03)
T4 FREE SERPL-MCNC: 1.1 NG/DL (ref 0.8–1.7)
TRIGL SERPL-MCNC: 83 MG/DL (ref 30–149)
TSI SER-ACNC: 3.81 MIU/ML (ref 0.36–3.74)
UROBILINOGEN UR STRIP.AUTO-MCNC: <2 MG/DL
VLDLC SERPL CALC-MCNC: 17 MG/DL (ref 0–30)
WBC # BLD AUTO: 3.3 X10(3) UL (ref 4–11)

## 2019-02-20 PROCEDURE — 36415 COLL VENOUS BLD VENIPUNCTURE: CPT | Performed by: FAMILY MEDICINE

## 2019-02-20 PROCEDURE — 84439 ASSAY OF FREE THYROXINE: CPT | Performed by: FAMILY MEDICINE

## 2019-02-20 PROCEDURE — 87624 HPV HI-RISK TYP POOLED RSLT: CPT | Performed by: FAMILY MEDICINE

## 2019-02-20 PROCEDURE — 81001 URINALYSIS AUTO W/SCOPE: CPT | Performed by: FAMILY MEDICINE

## 2019-02-20 PROCEDURE — 86003 ALLG SPEC IGE CRUDE XTRC EA: CPT | Performed by: FAMILY MEDICINE

## 2019-02-20 PROCEDURE — 80061 LIPID PANEL: CPT | Performed by: FAMILY MEDICINE

## 2019-02-20 PROCEDURE — 80050 GENERAL HEALTH PANEL: CPT | Performed by: FAMILY MEDICINE

## 2019-02-20 PROCEDURE — 82785 ASSAY OF IGE: CPT | Performed by: FAMILY MEDICINE

## 2019-02-20 PROCEDURE — 88175 CYTOPATH C/V AUTO FLUID REDO: CPT | Performed by: FAMILY MEDICINE

## 2019-02-20 PROCEDURE — 99396 PREV VISIT EST AGE 40-64: CPT | Performed by: FAMILY MEDICINE

## 2019-02-20 NOTE — PROGRESS NOTES
CC: Annual Physical Exam    HPI:   Vedia Sicard is a 39year old female who presents for a complete physical exam. Symptoms: No menses. .. Looking for allergy referral, increase GERD, issues with spinach. Had GI eval- neg EGD, Diandra.  Pt noting diffi 3.2 %    Basophil % 0.5 %    Immature Granulocyte % 0.2 %         Current Outpatient Medications:  ATORVASTATIN 10 MG Oral Tab TAKE 1 TABLET BY MOUTH EVERY AT BEDTIME Disp: 90 tablet Rfl: 0   HYDROCHLOROTHIAZIDE 25 MG Oral Tab TAKE 1 TABLET BY MOUTH EVERY Social History Narrative      Changed from job walking and working with  and now more seditary. Exercise: none.   Diet: doesn't watch     GYNE HX            REVIEW OF SYSTEMS:   CONSTITUTIONAL:  Denies unusual weight gain/loss, fever, chills, bilaterally, no eye discharge   Ears: TM's clear and intact bilaterally, no excess cerumen or erythema. Nose: patent, no nasal discharge   Throat:  No tonsillar erythema or exudate. Mouth:  No oral lesions or ulcerations, good dentition.   NECK: Supple, n Future  - TSH W REFLEX TO FREE T4; Future  - URINALYSIS WITH CULTURE REFLEX; Future  - THINPREP PAP SMEAR B; Future  - HPV HIGH RISK , THIN PREP COLLECTION; Future    2.  Benign essential hypertension  Blood pressure stable continue diet and exercise manage

## 2019-02-20 NOTE — PATIENT INSTRUCTIONS
rec fasting labs today    Pap done today    [id number] [address] for allergy lab test and allergy referral    Continue present medications, monitor BP if weight loss continued     Recheck 6 months for BP check, sooner if concerns

## 2019-02-21 LAB — HPV I/H RISK 1 DNA SPEC QL NAA+PROBE: NEGATIVE

## 2019-02-22 LAB
ALLERGEN,  IGE: 0.65 KU/L
ALLERGEN,  SHRIMP IGE: 0.1 KU/L
ALLERGEN,  TREE IGE: 0.24 KU/L
ALLERGEN, A.ALTERNATA(TENUIS): <0.1 KU/L
ALLERGEN, BERMUDA GRASS IGE: <0.1 KU/L
ALLERGEN, BOX ELDER/MAPLE IGE: 0.15 KU/L
ALLERGEN, CAT DANDER IGE: <0.1 KU/L
ALLERGEN, CLAM IGE: <0.1 KU/L
ALLERGEN, CODFISH: <0.1 KU/L
ALLERGEN, CORN IGE: <0.1 KU/L
ALLERGEN, COTTONWOOD IGE: 0.36 KU/L
ALLERGEN, D. FARINAE IGE: 13.7 KU/L
ALLERGEN, D.PTERONYSSINUS IGE: 13.8 KU/L
ALLERGEN, DOG DANDER IGE: <0.1 KU/L
ALLERGEN, EGG WHITE IGE: 0.12 KU/L
ALLERGEN, GERMAN COCKROACH IGE: 0.13 KU/L
ALLERGEN, HORMODENDRUM IGE: <0.1 KU/L
ALLERGEN, MARSH ELDER IGE: 0.21 KU/L
ALLERGEN, MILK (COW) IGE: 0.54 KU/L
ALLERGEN, MILK (COW) IGE: 0.54 KU/L
ALLERGEN, MOUNTAIN CEDAR IGE: 0.1 KU/L
ALLERGEN, MOUSE EPITHE IGE: <0.1 KU/L
ALLERGEN, MUCOR RACEMOSUS IGE: <0.1 KU/L
ALLERGEN, OAK TREE IGE: 0.13 KU/L
ALLERGEN, PEANUT IGE: 0.48 KU/L
ALLERGEN, PEANUT IGE: 0.48 KU/L
ALLERGEN, PECAN TREE IGE: 0.36 KU/L
ALLERGEN, PENICILLIUM NOTATUM: <0.1 KU/L
ALLERGEN, PIGWEED IGE: 0.14 KU/L
ALLERGEN, RUSSIAN THISTLE IGE: 0.3 KU/L
ALLERGEN, SCALLOP IGE: <0.1 KU/L
ALLERGEN, SHORT RAGWEED IGE: 1.4 KU/L
ALLERGEN, SOYBEAN IGE: <0.1 KU/L
ALLERGEN, TIMOTHY GRASS IGE: 3.13 KU/L
ALLERGEN, WALNUT TREE IGE: 0.65 KU/L
ALLERGEN, WALNUT/BLACK WALNUT: 0.22 KU/L
ALLERGEN, WHEAT IGE: 0.4 KU/L
ALLERGEN, WHITE ASH IGE: 0.32 KU/L
ALLERGEN, WHITE MULBERRY IGE: <0.1 KU/L
ALLERGEN,ASPERGILLUS FUMIGATUS: <0.1 KU/L
IMMUNOGLOBULIN E: 176 KU/L
IMMUNOGLOBULIN E: 176 KU/L

## 2019-02-25 ENCOUNTER — TELEPHONE (OUTPATIENT)
Dept: FAMILY MEDICINE CLINIC | Facility: CLINIC | Age: 44
End: 2019-02-25

## 2019-02-25 NOTE — TELEPHONE ENCOUNTER
----- Message from Marsha Alvarado MD sent at 2/22/2019  8:55 PM CST -----  Lab results noted  Pap normal, hpv negative. Recheck pap 5 year.   Food allergies show response to milk, peanut and wheat at low levels, I rec she continue with planned allergist

## 2019-02-25 NOTE — TELEPHONE ENCOUNTER
Patient informed of the below results and recommendations. Patient will c/b with an update after seeing the Allergist.  If they do not do a CBC, she will let us know so she can have it repeated here.   Patient will also make sure to start taking her Potass

## 2019-04-29 RX ORDER — POTASSIUM CHLORIDE 1500 MG/1
TABLET, EXTENDED RELEASE ORAL
Qty: 180 TABLET | Refills: 1 | Status: SHIPPED | OUTPATIENT
Start: 2019-04-29 | End: 2019-08-30

## 2019-04-29 NOTE — TELEPHONE ENCOUNTER
Future appt:    Last Appointment:  2/20/2019 [facility])- pt was advised to f/u in 6 months to check BP    Potassium refilled on 4/24/18 for 6 month supply    Potassium supplement was restarted after 2/20/19 lab draw- pt had not been taking regularly prior to labs    Cholesterol, Total (mg/dL)   Date Value   02/20/2019 196     HDL Cholesterol (mg/dL)   Date Value   02/20/2019 58     LDL Cholesterol (mg/dL)   Date Value   02/20/2019 121 (H)     Triglycerides (mg/dL)   Date Value   02/20/2019 83     No results found for: EAG, A1C  Lab Results   Component Value Date    T4F 1.1 02/20/2019    TSH 3.810 (H) 02/20/2019       No follow-ups on file.

## 2019-06-04 RX ORDER — HYDROCHLOROTHIAZIDE 25 MG/1
TABLET ORAL
Qty: 90 TABLET | Refills: 0 | Status: SHIPPED | OUTPATIENT
Start: 2019-06-04 | End: 2019-08-30

## 2019-06-04 NOTE — TELEPHONE ENCOUNTER
Future appt:    Last Appointment:  2/20/2019 [facility])- pt was advised to recheck in 6 months to f/u on BP      HCTZ was last refilled on 11/12/18      Future Appointments   Date Time Provider Jeronimo Keyes   8/16/2019  8:00 AM Meghan Aragon MD [facility]

## 2019-08-30 ENCOUNTER — APPOINTMENT (OUTPATIENT)
Dept: LAB | Age: 44
End: 2019-08-30
Attending: NURSE PRACTITIONER
Payer: COMMERCIAL

## 2019-08-30 ENCOUNTER — OFFICE VISIT (OUTPATIENT)
Dept: FAMILY MEDICINE CLINIC | Facility: CLINIC | Age: 44
End: 2019-08-30
Payer: COMMERCIAL

## 2019-08-30 VITALS
RESPIRATION RATE: 16 BRPM | HEART RATE: 78 BPM | TEMPERATURE: 99 F | DIASTOLIC BLOOD PRESSURE: 82 MMHG | HEIGHT: 64 IN | WEIGHT: 210.63 LBS | SYSTOLIC BLOOD PRESSURE: 122 MMHG | BODY MASS INDEX: 35.96 KG/M2

## 2019-08-30 DIAGNOSIS — I10 BENIGN ESSENTIAL HYPERTENSION: Primary | ICD-10-CM

## 2019-08-30 DIAGNOSIS — F43.21 SITUATIONAL DEPRESSION: ICD-10-CM

## 2019-08-30 DIAGNOSIS — R53.83 FATIGUE, UNSPECIFIED TYPE: ICD-10-CM

## 2019-08-30 DIAGNOSIS — E78.00 PURE HYPERCHOLESTEROLEMIA: ICD-10-CM

## 2019-08-30 LAB
ALBUMIN SERPL-MCNC: 3.8 G/DL (ref 3.4–5)
ALBUMIN/GLOB SERPL: 1.2 {RATIO} (ref 1–2)
ALP LIVER SERPL-CCNC: 66 U/L (ref 37–98)
ALT SERPL-CCNC: 32 U/L (ref 13–56)
ANION GAP SERPL CALC-SCNC: 11 MMOL/L (ref 0–18)
AST SERPL-CCNC: 24 U/L (ref 15–37)
BASOPHILS # BLD AUTO: 0.03 X10(3) UL (ref 0–0.2)
BASOPHILS NFR BLD AUTO: 0.5 %
BILIRUB SERPL-MCNC: 0.6 MG/DL (ref 0.1–2)
BUN BLD-MCNC: 15 MG/DL (ref 7–18)
BUN/CREAT SERPL: 19 (ref 10–20)
CALCIUM BLD-MCNC: 8.5 MG/DL (ref 8.5–10.1)
CHLORIDE SERPL-SCNC: 101 MMOL/L (ref 98–112)
CHOLEST SMN-MCNC: 208 MG/DL (ref ?–200)
CO2 SERPL-SCNC: 28 MMOL/L (ref 21–32)
CREAT BLD-MCNC: 0.79 MG/DL (ref 0.55–1.02)
DEPRECATED RDW RBC AUTO: 41.4 FL (ref 35.1–46.3)
EOSINOPHIL # BLD AUTO: 0.28 X10(3) UL (ref 0–0.7)
EOSINOPHIL NFR BLD AUTO: 4.9 %
ERYTHROCYTE [DISTWIDTH] IN BLOOD BY AUTOMATED COUNT: 12.1 % (ref 11–15)
GLOBULIN PLAS-MCNC: 3.3 G/DL (ref 2.8–4.4)
GLUCOSE BLD-MCNC: 86 MG/DL (ref 70–99)
HCT VFR BLD AUTO: 45.7 % (ref 35–48)
HDLC SERPL-MCNC: 67 MG/DL (ref 40–59)
HGB BLD-MCNC: 15 G/DL (ref 12–16)
IMM GRANULOCYTES # BLD AUTO: 0.01 X10(3) UL (ref 0–1)
IMM GRANULOCYTES NFR BLD: 0.2 %
LDLC SERPL CALC-MCNC: 119 MG/DL (ref ?–100)
LYMPHOCYTES # BLD AUTO: 1.54 X10(3) UL (ref 1–4)
LYMPHOCYTES NFR BLD AUTO: 27 %
M PROTEIN MFR SERPL ELPH: 7.1 G/DL (ref 6.4–8.2)
MCH RBC QN AUTO: 30.4 PG (ref 26–34)
MCHC RBC AUTO-ENTMCNC: 32.8 G/DL (ref 31–37)
MCV RBC AUTO: 92.5 FL (ref 80–100)
MONOCYTES # BLD AUTO: 0.48 X10(3) UL (ref 0.1–1)
MONOCYTES NFR BLD AUTO: 8.4 %
NEUTROPHILS # BLD AUTO: 3.36 X10 (3) UL (ref 1.5–7.7)
NEUTROPHILS # BLD AUTO: 3.36 X10(3) UL (ref 1.5–7.7)
NEUTROPHILS NFR BLD AUTO: 59 %
NONHDLC SERPL-MCNC: 141 MG/DL (ref ?–130)
OSMOLALITY SERPL CALC.SUM OF ELEC: 290 MOSM/KG (ref 275–295)
PLATELET # BLD AUTO: 298 10(3)UL (ref 150–450)
POTASSIUM SERPL-SCNC: 3.4 MMOL/L (ref 3.5–5.1)
RBC # BLD AUTO: 4.94 X10(6)UL (ref 3.8–5.3)
SODIUM SERPL-SCNC: 140 MMOL/L (ref 136–145)
TRIGL SERPL-MCNC: 111 MG/DL (ref 30–149)
TSI SER-ACNC: 3.66 MIU/ML (ref 0.36–3.74)
VLDLC SERPL CALC-MCNC: 22 MG/DL (ref 0–30)
WBC # BLD AUTO: 5.7 X10(3) UL (ref 4–11)

## 2019-08-30 PROCEDURE — 99214 OFFICE O/P EST MOD 30 MIN: CPT | Performed by: NURSE PRACTITIONER

## 2019-08-30 PROCEDURE — 80061 LIPID PANEL: CPT | Performed by: NURSE PRACTITIONER

## 2019-08-30 PROCEDURE — 80050 GENERAL HEALTH PANEL: CPT | Performed by: NURSE PRACTITIONER

## 2019-08-30 PROCEDURE — 36415 COLL VENOUS BLD VENIPUNCTURE: CPT | Performed by: NURSE PRACTITIONER

## 2019-08-30 RX ORDER — ATORVASTATIN CALCIUM 10 MG/1
TABLET, FILM COATED ORAL
Qty: 90 TABLET | Refills: 1 | Status: SHIPPED | OUTPATIENT
Start: 2019-08-30 | End: 2021-01-11

## 2019-08-30 RX ORDER — OMEPRAZOLE 20 MG/1
CAPSULE, DELAYED RELEASE ORAL
Qty: 90 CAPSULE | Refills: 0 | Status: SHIPPED | OUTPATIENT
Start: 2019-08-30 | End: 2020-02-26

## 2019-08-30 RX ORDER — HYDROCHLOROTHIAZIDE 25 MG/1
25 TABLET ORAL
Qty: 90 TABLET | Refills: 1 | Status: SHIPPED | OUTPATIENT
Start: 2019-08-30 | End: 2020-03-09

## 2019-08-30 RX ORDER — POTASSIUM CHLORIDE 1500 MG/1
1 TABLET, FILM COATED, EXTENDED RELEASE ORAL
Qty: 90 TABLET | Refills: 1 | Status: SHIPPED | OUTPATIENT
Start: 2019-08-30 | End: 2020-04-14

## 2019-08-30 RX ORDER — ESCITALOPRAM OXALATE 10 MG/1
10 TABLET ORAL DAILY
Qty: 30 TABLET | Refills: 0 | Status: SHIPPED | OUTPATIENT
Start: 2019-08-30 | End: 2019-10-04

## 2019-08-30 NOTE — PATIENT INSTRUCTIONS
Labs pending. Start Lexapro daily  Recheck in 3 - 4 weeks with Dr. Gabe Pitts - sooner if needed. Keep appointment for mammogram next week.

## 2019-09-03 ENCOUNTER — TELEPHONE (OUTPATIENT)
Dept: FAMILY MEDICINE CLINIC | Facility: CLINIC | Age: 44
End: 2019-09-03

## 2019-09-03 NOTE — TELEPHONE ENCOUNTER
----- Message from [address] sent at 8/31/2019  8:35 AM CDT -----  Left message for patient to return call    Notes recorded by ARELI Garner on 8/31/2019 at 8:15 AM CDT  Her CMP shows that her potassium is slightly low.  However it is

## 2019-09-03 NOTE — TELEPHONE ENCOUNTER
Spoke with patient and she was notified of results and recommendations as per Brenda. Pt verbalized understanding.

## 2019-10-04 ENCOUNTER — TELEPHONE (OUTPATIENT)
Dept: FAMILY MEDICINE CLINIC | Facility: CLINIC | Age: 44
End: 2019-10-04

## 2019-10-04 RX ORDER — ESCITALOPRAM OXALATE 10 MG/1
10 TABLET ORAL DAILY
Qty: 30 TABLET | Refills: 3 | Status: SHIPPED | OUTPATIENT
Start: 2019-10-04 | End: 2020-02-10

## 2019-10-04 NOTE — TELEPHONE ENCOUNTER
Pt states she was seen per Brenda on 8/30. Pt states she has >$400 in medical bills. Pt states she wants to report that she is doing very well on Lexapro. Pt states she is happier and more active. Pt denies having any ill side effects.     Pt asking for

## 2019-10-04 NOTE — TELEPHONE ENCOUNTER
Future appt:    Last Appointment with provider:   Visit date not found  Last appointment at [facility]:  8/30/2019  Last px on file: 2/20/19    Escitalopram refilled on 8/30/19 for #30    Lexapro was started per CS- pt was advised to f/u in 3-4 wks per C

## 2020-02-10 RX ORDER — ESCITALOPRAM OXALATE 10 MG/1
TABLET ORAL
Qty: 30 TABLET | Refills: 3 | Status: SHIPPED | OUTPATIENT
Start: 2020-02-10 | End: 2020-06-05

## 2020-02-10 NOTE — TELEPHONE ENCOUNTER
Future appt:     Your appointments     Date & Time Appointment Department [facility])    Feb 26, 2020  8:30 AM CST Physical - Established with Abdi Hernandez MD [address] [place] [address], [facility]

## 2020-02-26 ENCOUNTER — APPOINTMENT (OUTPATIENT)
Dept: LAB | Age: 45
End: 2020-02-26
Attending: FAMILY MEDICINE
Payer: COMMERCIAL

## 2020-02-26 ENCOUNTER — OFFICE VISIT (OUTPATIENT)
Dept: FAMILY MEDICINE CLINIC | Facility: CLINIC | Age: 45
End: 2020-02-26
Payer: COMMERCIAL

## 2020-02-26 ENCOUNTER — TELEPHONE (OUTPATIENT)
Dept: FAMILY MEDICINE CLINIC | Facility: CLINIC | Age: 45
End: 2020-02-26

## 2020-02-26 VITALS
DIASTOLIC BLOOD PRESSURE: 80 MMHG | SYSTOLIC BLOOD PRESSURE: 122 MMHG | HEIGHT: 64 IN | WEIGHT: 204.38 LBS | OXYGEN SATURATION: 98 % | HEART RATE: 77 BPM | RESPIRATION RATE: 16 BRPM | BODY MASS INDEX: 34.89 KG/M2 | TEMPERATURE: 98 F

## 2020-02-26 DIAGNOSIS — E78.00 PURE HYPERCHOLESTEROLEMIA: Primary | ICD-10-CM

## 2020-02-26 DIAGNOSIS — I10 BENIGN ESSENTIAL HYPERTENSION: ICD-10-CM

## 2020-02-26 DIAGNOSIS — F41.9 ANXIETY: ICD-10-CM

## 2020-02-26 DIAGNOSIS — E87.6 HYPOKALEMIA: ICD-10-CM

## 2020-02-26 DIAGNOSIS — E78.00 PURE HYPERCHOLESTEROLEMIA: ICD-10-CM

## 2020-02-26 DIAGNOSIS — Z00.00 ANNUAL PHYSICAL EXAM: Primary | ICD-10-CM

## 2020-02-26 LAB
ALBUMIN SERPL-MCNC: 3.9 G/DL (ref 3.4–5)
ALBUMIN/GLOB SERPL: 1 {RATIO} (ref 1–2)
ALP LIVER SERPL-CCNC: 68 U/L (ref 37–98)
ALT SERPL-CCNC: 24 U/L (ref 13–56)
ANION GAP SERPL CALC-SCNC: 3 MMOL/L (ref 0–18)
AST SERPL-CCNC: 18 U/L (ref 15–37)
BASOPHILS # BLD AUTO: 0.04 X10(3) UL (ref 0–0.2)
BASOPHILS NFR BLD AUTO: 0.9 %
BILIRUB SERPL-MCNC: 0.6 MG/DL (ref 0.1–2)
BILIRUB UR QL STRIP.AUTO: NEGATIVE
BUN BLD-MCNC: 17 MG/DL (ref 7–18)
BUN/CREAT SERPL: 19.3 (ref 10–20)
CALCIUM BLD-MCNC: 9 MG/DL (ref 8.5–10.1)
CHLORIDE SERPL-SCNC: 101 MMOL/L (ref 98–112)
CHOLEST SMN-MCNC: 251 MG/DL (ref ?–200)
CLARITY UR REFRACT.AUTO: CLEAR
CO2 SERPL-SCNC: 33 MMOL/L (ref 21–32)
COLOR UR AUTO: YELLOW
CREAT BLD-MCNC: 0.88 MG/DL (ref 0.55–1.02)
DEPRECATED RDW RBC AUTO: 41.2 FL (ref 35.1–46.3)
EOSINOPHIL # BLD AUTO: 0.21 X10(3) UL (ref 0–0.7)
EOSINOPHIL NFR BLD AUTO: 4.6 %
ERYTHROCYTE [DISTWIDTH] IN BLOOD BY AUTOMATED COUNT: 12 % (ref 11–15)
GLOBULIN PLAS-MCNC: 4.1 G/DL (ref 2.8–4.4)
GLUCOSE BLD-MCNC: 87 MG/DL (ref 70–99)
GLUCOSE UR STRIP.AUTO-MCNC: NEGATIVE MG/DL
HCT VFR BLD AUTO: 46 % (ref 35–48)
HDLC SERPL-MCNC: 63 MG/DL (ref 40–59)
HGB BLD-MCNC: 15.2 G/DL (ref 12–16)
IMM GRANULOCYTES # BLD AUTO: 0 X10(3) UL (ref 0–1)
IMM GRANULOCYTES NFR BLD: 0 %
KETONES UR STRIP.AUTO-MCNC: NEGATIVE MG/DL
LDLC SERPL CALC-MCNC: 167 MG/DL (ref ?–100)
LEUKOCYTE ESTERASE UR QL STRIP.AUTO: NEGATIVE
LYMPHOCYTES # BLD AUTO: 1.41 X10(3) UL (ref 1–4)
LYMPHOCYTES NFR BLD AUTO: 30.6 %
M PROTEIN MFR SERPL ELPH: 8 G/DL (ref 6.4–8.2)
MCH RBC QN AUTO: 30.6 PG (ref 26–34)
MCHC RBC AUTO-ENTMCNC: 33 G/DL (ref 31–37)
MCV RBC AUTO: 92.7 FL (ref 80–100)
MONOCYTES # BLD AUTO: 0.4 X10(3) UL (ref 0.1–1)
MONOCYTES NFR BLD AUTO: 8.7 %
NEUTROPHILS # BLD AUTO: 2.55 X10 (3) UL (ref 1.5–7.7)
NEUTROPHILS # BLD AUTO: 2.55 X10(3) UL (ref 1.5–7.7)
NEUTROPHILS NFR BLD AUTO: 55.2 %
NITRITE UR QL STRIP.AUTO: NEGATIVE
NONHDLC SERPL-MCNC: 188 MG/DL (ref ?–130)
OSMOLALITY SERPL CALC.SUM OF ELEC: 285 MOSM/KG (ref 275–295)
PATIENT FASTING Y/N/NP: YES
PATIENT FASTING Y/N/NP: YES
PH UR STRIP.AUTO: 7 [PH] (ref 4.5–8)
PLATELET # BLD AUTO: 343 10(3)UL (ref 150–450)
POTASSIUM SERPL-SCNC: 3.2 MMOL/L (ref 3.5–5.1)
PROT UR STRIP.AUTO-MCNC: NEGATIVE MG/DL
RBC # BLD AUTO: 4.96 X10(6)UL (ref 3.8–5.3)
SODIUM SERPL-SCNC: 137 MMOL/L (ref 136–145)
SP GR UR STRIP.AUTO: 1.02 (ref 1–1.03)
TRIGL SERPL-MCNC: 106 MG/DL (ref 30–149)
TSI SER-ACNC: 3.59 MIU/ML (ref 0.36–3.74)
UROBILINOGEN UR STRIP.AUTO-MCNC: <2 MG/DL
VLDLC SERPL CALC-MCNC: 21 MG/DL (ref 0–30)
WBC # BLD AUTO: 4.6 X10(3) UL (ref 4–11)

## 2020-02-26 PROCEDURE — 36415 COLL VENOUS BLD VENIPUNCTURE: CPT | Performed by: FAMILY MEDICINE

## 2020-02-26 PROCEDURE — 80050 GENERAL HEALTH PANEL: CPT | Performed by: FAMILY MEDICINE

## 2020-02-26 PROCEDURE — 99396 PREV VISIT EST AGE 40-64: CPT | Performed by: FAMILY MEDICINE

## 2020-02-26 PROCEDURE — 81001 URINALYSIS AUTO W/SCOPE: CPT | Performed by: FAMILY MEDICINE

## 2020-02-26 PROCEDURE — 80061 LIPID PANEL: CPT | Performed by: FAMILY MEDICINE

## 2020-02-26 NOTE — PATIENT INSTRUCTIONS
Rec exercise 3-5 x a week    fasting labs today    Continue medications    Mammogram yearly    rec flu vaccine in the fall

## 2020-02-26 NOTE — PROGRESS NOTES
CC: Annual Physical Exam    HPI:   Zacarias Thomas is a 38year old female who presents for a complete physical exam.  Patient generally feeling well. 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   Not exercising du 4. 94 3.80 - 5.30 x10(6)uL    HGB 15.0 12.0 - 16.0 g/dL    HCT 45.7 35.0 - 48.0 %    .0 150.0 - 450.0 10(3)uL    MCV 92.5 80.0 - 100.0 fL    MCH 30.4 26.0 - 34.0 pg    MCHC 32.8 31.0 - 37.0 g/dL    RDW 12.1 11.0 - 15.0 %    RDW-SD 41.4 35.1 - 46.3 fL Highest education level: Not on file    Occupational History      Occupation: tax and accounting        Comment: [employer]    Tobacco Use      Smoking status: Former Smoker        Types: Cigarettes        Quit date: 2/1/2004        Years since Denies depression or anxiety. ENDOCRINOLOGIC:  Denies excessive sweating, cold or heat intolerance, polyuria or polydipsia. ALLERGIES:  Denies allergic response, history of asthma, sneezing, hives, eczema or rhinitis.   : hx pf ablation        EXAM:   B complete physical exam.     1. Annual physical exam  General health screening protocols and cancer screening protocols reviewed with patient. 2. Pure hypercholesterolemia  Hyperlipidemia follow-up laboratories today.   Encourage diet exercise and complia weekly. The patient indicates understanding of these issues and agrees to the plan.         Meds & Refills for this Visit:  Requested Prescriptions      No prescriptions requested or ordered in this encounter       Imaging & Consults:  None    The patient

## 2020-02-27 NOTE — TELEPHONE ENCOUNTER
----- Message from Dorie Aguirre MD sent at 2/26/2020  6:29 PM CST -----  Laboratory results reviewed. Patient's potassium low at 3. 2. Chemistry panel otherwise stable. Cholesterol elevated 251 with an HDL of 63 and an LDL of 167.   Thyroid function n

## 2020-03-02 NOTE — TELEPHONE ENCOUNTER
Pt informed, states she was able to see results on my chart. Pt scheduled follow up lab appt.     Future Appointments   Date Time Provider Jeronimo Keyes   5/28/2020  8:00 AM REF [place] REF [facility]

## 2020-03-09 RX ORDER — HYDROCHLOROTHIAZIDE 25 MG/1
TABLET ORAL
Qty: 90 TABLET | Refills: 1 | Status: SHIPPED | OUTPATIENT
Start: 2020-03-09 | End: 2020-07-22

## 2020-03-09 NOTE — TELEPHONE ENCOUNTER
Future appt: Your appointments     Date & Time Appointment Department [facility])    May 28, 2020  8:00 AM CDT Laboratory Visit with REF Mahad Mendez [facility] ([facility])            [facility]  [facility] Ref Lab [id number]  [address]

## 2020-04-14 RX ORDER — POTASSIUM CHLORIDE 1500 MG/1
TABLET, FILM COATED, EXTENDED RELEASE ORAL
Qty: 90 TABLET | Refills: 1 | Status: SHIPPED | OUTPATIENT
Start: 2020-04-14 | End: 2020-05-29

## 2020-04-14 NOTE — TELEPHONE ENCOUNTER
Future appt: Your appointments     Date & Time Appointment Department [facility])    May 28, 2020  8:00 AM CDT Laboratory Visit with REF Racheal Eaton [facility] ([facility] Ref Lab [facility])            [facility] Lab  EDW Ref Lab [id number]  [address]

## 2020-05-28 ENCOUNTER — APPOINTMENT (OUTPATIENT)
Dept: LAB | Age: 45
End: 2020-05-28
Attending: FAMILY MEDICINE
Payer: COMMERCIAL

## 2020-05-28 DIAGNOSIS — E78.00 PURE HYPERCHOLESTEROLEMIA: ICD-10-CM

## 2020-05-28 DIAGNOSIS — E87.6 HYPOKALEMIA: ICD-10-CM

## 2020-05-28 PROCEDURE — 80061 LIPID PANEL: CPT | Performed by: FAMILY MEDICINE

## 2020-05-28 PROCEDURE — 80053 COMPREHEN METABOLIC PANEL: CPT | Performed by: FAMILY MEDICINE

## 2020-05-28 PROCEDURE — 36415 COLL VENOUS BLD VENIPUNCTURE: CPT | Performed by: FAMILY MEDICINE

## 2020-05-29 ENCOUNTER — TELEPHONE (OUTPATIENT)
Dept: FAMILY MEDICINE CLINIC | Facility: CLINIC | Age: 45
End: 2020-05-29

## 2020-05-29 DIAGNOSIS — E87.6 HYPOKALEMIA: Primary | ICD-10-CM

## 2020-05-29 RX ORDER — POTASSIUM CHLORIDE 1500 MG/1
40 TABLET, FILM COATED, EXTENDED RELEASE ORAL DAILY
COMMUNITY
End: 2020-08-04

## 2020-05-29 NOTE — TELEPHONE ENCOUNTER
----- Message from Whitney Whiting MD sent at 5/29/2020  8:10 AM CDT -----  Pt labs reviewed- chemistry panel with low potassium. Lipids- normal  Please check with pt re patient taking potassium suppliment.  If pt taking once a day daily- then I would

## 2020-05-29 NOTE — TELEPHONE ENCOUNTER
Pt informed. Pt confirmed- she is taking her Potassium daily-  Pt is not missing any doses. States she did see results on my chart. Pt states she has noticed she has been low with Potassium with previous lab draws also.   Pt agreed to increase her RX to

## 2020-05-29 NOTE — TELEPHONE ENCOUNTER
Pt informed. appt sched.       Future Appointments   Date Time Provider Jeronimo Worleyi   7/1/2020  8:45 AM REF [place] REF [facility] [id number] [address] Ref Syc

## 2020-06-05 RX ORDER — ESCITALOPRAM OXALATE 10 MG/1
TABLET ORAL
Qty: 90 TABLET | Refills: 0 | Status: SHIPPED | OUTPATIENT
Start: 2020-06-05 | End: 2020-07-22

## 2020-06-05 NOTE — TELEPHONE ENCOUNTER
Future appt: Your appointments     Date & Time Appointment Department [facility])    Jul 01, 2020  8:45 AM CDT Laboratory Visit with REF Bunny Hives Reference Lab ([facility] Ref Lab Mattie Manning)            [facility]  ED[facility] Ref Lab [id number]  [address]

## 2020-07-01 ENCOUNTER — APPOINTMENT (OUTPATIENT)
Dept: LAB | Age: 45
End: 2020-07-01
Attending: FAMILY MEDICINE
Payer: COMMERCIAL

## 2020-07-01 DIAGNOSIS — E87.6 HYPOKALEMIA: ICD-10-CM

## 2020-07-01 LAB
ANION GAP SERPL CALC-SCNC: 4 MMOL/L (ref 0–18)
BUN BLD-MCNC: 20 MG/DL (ref 7–18)
BUN/CREAT SERPL: 23.8 (ref 10–20)
CALCIUM BLD-MCNC: 8.9 MG/DL (ref 8.5–10.1)
CHLORIDE SERPL-SCNC: 104 MMOL/L (ref 98–112)
CO2 SERPL-SCNC: 31 MMOL/L (ref 21–32)
CREAT BLD-MCNC: 0.84 MG/DL (ref 0.55–1.02)
GLUCOSE BLD-MCNC: 85 MG/DL (ref 70–99)
OSMOLALITY SERPL CALC.SUM OF ELEC: 290 MOSM/KG (ref 275–295)
PATIENT FASTING Y/N/NP: YES
POTASSIUM SERPL-SCNC: 3.7 MMOL/L (ref 3.5–5.1)
SODIUM SERPL-SCNC: 139 MMOL/L (ref 136–145)

## 2020-07-01 PROCEDURE — 36415 COLL VENOUS BLD VENIPUNCTURE: CPT | Performed by: FAMILY MEDICINE

## 2020-07-01 PROCEDURE — 80048 BASIC METABOLIC PNL TOTAL CA: CPT | Performed by: FAMILY MEDICINE

## 2020-07-22 RX ORDER — ESCITALOPRAM OXALATE 10 MG/1
TABLET ORAL
Qty: 90 TABLET | Refills: 0 | Status: SHIPPED | OUTPATIENT
Start: 2020-07-22 | End: 2020-09-21

## 2020-07-22 RX ORDER — HYDROCHLOROTHIAZIDE 25 MG/1
TABLET ORAL
Qty: 90 TABLET | Refills: 1 | Status: SHIPPED | OUTPATIENT
Start: 2020-07-22 | End: 2021-02-15

## 2020-07-22 NOTE — TELEPHONE ENCOUNTER
Future appt:    Last Appointment with provider:   2/26/2020Cindy Starr-  Pt was advised to f/u in 6 months  Last appointment at [facility]:  2/26/2020    HCTZ refilled on 3/9/20 for 6 month supply    Future Appointments   Date Time Provider Jeronimo Keyes

## 2020-08-03 ENCOUNTER — TELEPHONE (OUTPATIENT)
Dept: FAMILY MEDICINE CLINIC | Facility: CLINIC | Age: 45
End: 2020-08-03

## 2020-08-03 NOTE — TELEPHONE ENCOUNTER
Patient states that she has been having abdonimal pain on and off, wants appt with the  [id number] [address]issa [address]ward to schedule appt since abdominal pain is a symptom from TS questions? Would like a call back.

## 2020-08-03 NOTE — TELEPHONE ENCOUNTER
Scheduled patient to see Jerica Latham tomorrow 8/4 at 3 pm.    Patient verbalized understanding and has no further questions or concerns.

## 2020-08-04 ENCOUNTER — OFFICE VISIT (OUTPATIENT)
Dept: FAMILY MEDICINE CLINIC | Facility: CLINIC | Age: 45
End: 2020-08-04
Payer: COMMERCIAL

## 2020-08-04 VITALS
RESPIRATION RATE: 16 BRPM | HEART RATE: 77 BPM | OXYGEN SATURATION: 98 % | WEIGHT: 219.38 LBS | HEIGHT: 64 IN | SYSTOLIC BLOOD PRESSURE: 130 MMHG | BODY MASS INDEX: 37.45 KG/M2 | TEMPERATURE: 98 F | DIASTOLIC BLOOD PRESSURE: 70 MMHG

## 2020-08-04 DIAGNOSIS — K57.92 DIVERTICULITIS: Primary | ICD-10-CM

## 2020-08-04 PROCEDURE — 99214 OFFICE O/P EST MOD 30 MIN: CPT | Performed by: NURSE PRACTITIONER

## 2020-08-04 PROCEDURE — 3078F DIAST BP <80 MM HG: CPT | Performed by: NURSE PRACTITIONER

## 2020-08-04 PROCEDURE — 3075F SYST BP GE 130 - 139MM HG: CPT | Performed by: NURSE PRACTITIONER

## 2020-08-04 PROCEDURE — 3008F BODY MASS INDEX DOCD: CPT | Performed by: NURSE PRACTITIONER

## 2020-08-04 RX ORDER — METRONIDAZOLE 500 MG/1
500 TABLET ORAL 3 TIMES DAILY
Qty: 21 TABLET | Refills: 0 | Status: SHIPPED | OUTPATIENT
Start: 2020-08-04 | End: 2020-08-11

## 2020-08-04 RX ORDER — POTASSIUM CHLORIDE 20 MEQ/1
1 TABLET, EXTENDED RELEASE ORAL
COMMUNITY
End: 2020-10-13

## 2020-08-04 RX ORDER — CIPROFLOXACIN 500 MG/1
500 TABLET, FILM COATED ORAL 2 TIMES DAILY
Qty: 14 TABLET | Refills: 0 | Status: SHIPPED | OUTPATIENT
Start: 2020-08-04 | End: 2020-08-11

## 2020-08-04 NOTE — PROGRESS NOTES
Malika Kirk is a 54year old female. Patient presents with:  Abdominal Pain: Going on 2 weeks.   Diarrhea  Dizziness: when pain is severe      HPI:   Complaints of abdominal pain for 2 weeks- pain to LLQ -   History of uterine ablation   Has had pain dana Comment: 1 drink -1-2 times per wk-  wine or beer    Drug use: No    Family History   Problem Relation Age of Onset   • Cancer Father 39   • Prostate Cancer Father    • Hypertension Mother    • Breast Cancer Mother         Allergies    Penicillins Clear liquid diet, avoid dairy and acidic, spicy, and fatty foods. When ready for food try BRAT diet (bananas, rice, applesauce, tea/toast) and crackers.  Follow up if symptoms persist or increase (fever, blood in your stool, dark black/sticky stools, or s · Take antibiotics exactly as told. Don't miss any doses or stop taking the medicine, even if you feel better. · Monitor your temperature. Tell your healthcare provider if you have a rising temperature.   Preventing future attacks  Once you have an episode · Rectal bleeding (stools that are red, black, or maroon in color)  · Unexpected vaginal bleeding  Hortensia last reviewed this educational content on 8/1/2019  © 4546-1204 [employer] [address]. 140[address] [facility], [address] [place] [address] [place].  All rights res

## 2020-08-04 NOTE — PATIENT INSTRUCTIONS
Clear liquid diet, avoid dairy and acidic, spicy, and fatty foods. When ready for food try BRAT diet (bananas, rice, applesauce, tea/toast) and crackers.  Follow up if symptoms persist or increase (fever, blood in your stool, dark black/sticky stools, or s · Take antibiotics exactly as told. Don't miss any doses or stop taking the medicine, even if you feel better. · Monitor your temperature. Tell your healthcare provider if you have a rising temperature.   Preventing future attacks  Once you have an episode · Rectal bleeding (stools that are red, black, or maroon in color)  · Unexpected vaginal bleeding  H[employer] last reviewed this educational content on 8/1/2019  © 9894-2358 [employer]. [address] [facility], [address] [place] [address] [place].  All rights res

## 2020-08-18 ENCOUNTER — TELEPHONE (OUTPATIENT)
Dept: FAMILY MEDICINE CLINIC | Facility: CLINIC | Age: 45
End: 2020-08-18

## 2020-08-18 NOTE — TELEPHONE ENCOUNTER
having a flare up of diverticulitis. back pain and numbness. said yes to having chills on ts questions.

## 2020-08-18 NOTE — TELEPHONE ENCOUNTER
Pt seen on 8/4. Treated for Diverticulosis. Pt states she started to have terrible back pain (mid low back) last night- rated pain level last night and during the night at a \"10\".   Pt took 1,000 mg of Tylenol at 3am and 600 mg of Ibuprofen at 6:30am- p

## 2020-08-19 ENCOUNTER — TELEPHONE (OUTPATIENT)
Dept: FAMILY MEDICINE CLINIC | Facility: CLINIC | Age: 45
End: 2020-08-19

## 2020-08-19 NOTE — TELEPHONE ENCOUNTER
Patient update:  Pt had labs and CT of ab at [facility] 8/18/20. Pt states she is dehydrated. Pt states [facility]- dx: pt with colitis. Pt feels better today, but still has ongoing diarrhea. Pt has appt with Dr. Jere Regalado 8/20/20. Vanna Kellogg   Pt has appt already on file wi

## 2020-08-25 ENCOUNTER — TELEPHONE (OUTPATIENT)
Dept: FAMILY MEDICINE CLINIC | Facility: CLINIC | Age: 45
End: 2020-08-25

## 2020-08-25 NOTE — TELEPHONE ENCOUNTER
Spoke with patient and she had her Covid test at [facility] in [facility]. Patient states Immediate care  just called her and told her the Covid test was negative. See below. Patient had to have Covid test for upcoming colonoscopy.  Patient den

## 2020-08-25 NOTE — TELEPHONE ENCOUNTER
has appt 8/26    answered YES to COVID viral test    did 8/20  and awaiting results in order to schedule her colonoscopy        please triage this call       Future Appointments   Date Time Provider Jeronimo Keyes   8/26/2020  9:30 AM Hiwot Mayen

## 2020-08-26 ENCOUNTER — LAB ENCOUNTER (OUTPATIENT)
Dept: LAB | Age: 45
End: 2020-08-26
Attending: FAMILY MEDICINE
Payer: COMMERCIAL

## 2020-08-26 ENCOUNTER — OFFICE VISIT (OUTPATIENT)
Dept: FAMILY MEDICINE CLINIC | Facility: CLINIC | Age: 45
End: 2020-08-26
Payer: COMMERCIAL

## 2020-08-26 VITALS
DIASTOLIC BLOOD PRESSURE: 76 MMHG | BODY MASS INDEX: 35.82 KG/M2 | OXYGEN SATURATION: 97 % | TEMPERATURE: 98 F | HEIGHT: 64 IN | RESPIRATION RATE: 16 BRPM | HEART RATE: 78 BPM | SYSTOLIC BLOOD PRESSURE: 122 MMHG | WEIGHT: 209.81 LBS

## 2020-08-26 DIAGNOSIS — K57.92 DIVERTICULITIS: Primary | ICD-10-CM

## 2020-08-26 DIAGNOSIS — R13.19 ESOPHAGEAL DYSPHAGIA: ICD-10-CM

## 2020-08-26 DIAGNOSIS — R93.89 ABNORMAL FINDING ON CT SCAN: ICD-10-CM

## 2020-08-26 DIAGNOSIS — K57.92 DIVERTICULITIS: ICD-10-CM

## 2020-08-26 DIAGNOSIS — I10 BENIGN ESSENTIAL HYPERTENSION: ICD-10-CM

## 2020-08-26 DIAGNOSIS — R93.89 ABNORMAL FINDING PRESENT ON DIAGNOSTIC IMAGING OF UTERUS: ICD-10-CM

## 2020-08-26 LAB
ALBUMIN SERPL-MCNC: 3.6 G/DL (ref 3.4–5)
ALBUMIN/GLOB SERPL: 0.9 {RATIO} (ref 1–2)
ALP LIVER SERPL-CCNC: 63 U/L (ref 39–100)
ALT SERPL-CCNC: 25 U/L (ref 13–56)
ANION GAP SERPL CALC-SCNC: 5 MMOL/L (ref 0–18)
AST SERPL-CCNC: 20 U/L (ref 15–37)
BASOPHILS # BLD AUTO: 0.06 X10(3) UL (ref 0–0.2)
BASOPHILS NFR BLD AUTO: 0.9 %
BILIRUB SERPL-MCNC: 0.3 MG/DL (ref 0.1–2)
BUN BLD-MCNC: 10 MG/DL (ref 7–18)
BUN/CREAT SERPL: 12.2 (ref 10–20)
CALCIUM BLD-MCNC: 9.5 MG/DL (ref 8.5–10.1)
CHLORIDE SERPL-SCNC: 100 MMOL/L (ref 98–112)
CO2 SERPL-SCNC: 32 MMOL/L (ref 21–32)
CREAT BLD-MCNC: 0.82 MG/DL (ref 0.55–1.02)
DEPRECATED RDW RBC AUTO: 40.9 FL (ref 35.1–46.3)
EOSINOPHIL # BLD AUTO: 0.54 X10(3) UL (ref 0–0.7)
EOSINOPHIL NFR BLD AUTO: 7.9 %
ERYTHROCYTE [DISTWIDTH] IN BLOOD BY AUTOMATED COUNT: 11.9 % (ref 11–15)
GLOBULIN PLAS-MCNC: 3.8 G/DL (ref 2.8–4.4)
GLUCOSE BLD-MCNC: 87 MG/DL (ref 70–99)
HCT VFR BLD AUTO: 44.2 % (ref 35–48)
HGB BLD-MCNC: 14.3 G/DL (ref 12–16)
IMM GRANULOCYTES # BLD AUTO: 0.04 X10(3) UL (ref 0–1)
IMM GRANULOCYTES NFR BLD: 0.6 %
LYMPHOCYTES # BLD AUTO: 1.55 X10(3) UL (ref 1–4)
LYMPHOCYTES NFR BLD AUTO: 22.7 %
M PROTEIN MFR SERPL ELPH: 7.4 G/DL (ref 6.4–8.2)
MCH RBC QN AUTO: 30.2 PG (ref 26–34)
MCHC RBC AUTO-ENTMCNC: 32.4 G/DL (ref 31–37)
MCV RBC AUTO: 93.4 FL (ref 80–100)
MONOCYTES # BLD AUTO: 0.63 X10(3) UL (ref 0.1–1)
MONOCYTES NFR BLD AUTO: 9.2 %
NEUTROPHILS # BLD AUTO: 4.02 X10 (3) UL (ref 1.5–7.7)
NEUTROPHILS # BLD AUTO: 4.02 X10(3) UL (ref 1.5–7.7)
NEUTROPHILS NFR BLD AUTO: 58.7 %
OSMOLALITY SERPL CALC.SUM OF ELEC: 282 MOSM/KG (ref 275–295)
PATIENT FASTING Y/N/NP: YES
PLATELET # BLD AUTO: 403 10(3)UL (ref 150–450)
POTASSIUM SERPL-SCNC: 3.1 MMOL/L (ref 3.5–5.1)
RBC # BLD AUTO: 4.73 X10(6)UL (ref 3.8–5.3)
SODIUM SERPL-SCNC: 137 MMOL/L (ref 136–145)
WBC # BLD AUTO: 6.8 X10(3) UL (ref 4–11)

## 2020-08-26 PROCEDURE — 80053 COMPREHEN METABOLIC PANEL: CPT | Performed by: FAMILY MEDICINE

## 2020-08-26 PROCEDURE — 3078F DIAST BP <80 MM HG: CPT | Performed by: FAMILY MEDICINE

## 2020-08-26 PROCEDURE — 3074F SYST BP LT 130 MM HG: CPT | Performed by: FAMILY MEDICINE

## 2020-08-26 PROCEDURE — 99214 OFFICE O/P EST MOD 30 MIN: CPT | Performed by: FAMILY MEDICINE

## 2020-08-26 PROCEDURE — 36415 COLL VENOUS BLD VENIPUNCTURE: CPT | Performed by: FAMILY MEDICINE

## 2020-08-26 PROCEDURE — 3008F BODY MASS INDEX DOCD: CPT | Performed by: FAMILY MEDICINE

## 2020-08-26 PROCEDURE — 85025 COMPLETE CBC W/AUTO DIFF WBC: CPT | Performed by: FAMILY MEDICINE

## 2020-08-26 NOTE — PROGRESS NOTES
Zacarias Thomas is a 54year old female. Patient presents with:  ER F/U: Pt states that she was in the ER at the beginning of August for Diverticulitis. Symptoms flared again on 8/17 and then went to the ER on 8/18. F/U with gastro 8/20 was completed.  Sche Past Medical History:   Diagnosis Date   • Anxiety 2020   • Calculus of kidney    • Essential hypertension    • Hyperlipidemia       Past Surgical History:   Procedure Laterality Date   •   ,      • LAP ABLAT UTERINE FIBROIDS  2008 no cyanosis, clubbing or edema  PSYCH: moods good, normal focus and interactions    ASSESSMENT AND PLAN:   Pt presents for a recheck of her .   (K57.92) Diverticulitis  (primary encounter diagnosis)  Plan: CBC WITH DIFFERENTIAL WITH PLATELET, COMP

## 2020-08-26 NOTE — PATIENT INSTRUCTIONS
rec labs today    rec continue potassium suppliment    rec gyne eval of abnormal uterine findings    Follow for results of GI testing

## 2020-08-27 ENCOUNTER — TELEPHONE (OUTPATIENT)
Dept: FAMILY MEDICINE CLINIC | Facility: CLINIC | Age: 45
End: 2020-08-27

## 2020-08-27 NOTE — TELEPHONE ENCOUNTER
----- Message from Whitney Whiting MD sent at 8/26/2020  8:42 PM CDT -----  Lab results  Cbc-normal  Chemistry- low potasium- rec increase potassium suppliment ro 2 tabs a day ( 40 meq)  Recheck in 1-2 months    No future appointments.

## 2020-08-27 NOTE — TELEPHONE ENCOUNTER
Since the appointment is not until October she will need to have COVID screening again closer to her appointment. If patient is having symptoms that she would like to be seen again–please schedule an appointment.

## 2020-09-21 RX ORDER — ESCITALOPRAM OXALATE 10 MG/1
TABLET ORAL
Qty: 90 TABLET | Refills: 0 | Status: SHIPPED | OUTPATIENT
Start: 2020-09-21 | End: 2021-10-13

## 2020-10-05 RX ORDER — POTASSIUM CHLORIDE 1500 MG/1
TABLET, FILM COATED, EXTENDED RELEASE ORAL
Qty: 90 TABLET | Refills: 1 | Status: SHIPPED | OUTPATIENT
Start: 2020-10-05 | End: 2020-10-13

## 2020-10-05 NOTE — TELEPHONE ENCOUNTER
Future appt:     Your appointments     Date & Time Appointment Department [facility])    Oct 13, 2020  9:00 AM CDT Exam - Established with Patrick Heck MD [address] [place] [address]

## 2020-10-13 ENCOUNTER — OFFICE VISIT (OUTPATIENT)
Dept: FAMILY MEDICINE CLINIC | Facility: CLINIC | Age: 45
End: 2020-10-13
Payer: COMMERCIAL

## 2020-10-13 VITALS
BODY MASS INDEX: 36.7 KG/M2 | RESPIRATION RATE: 16 BRPM | OXYGEN SATURATION: 96 % | WEIGHT: 215 LBS | TEMPERATURE: 98 F | HEART RATE: 78 BPM | HEIGHT: 64 IN | SYSTOLIC BLOOD PRESSURE: 122 MMHG | DIASTOLIC BLOOD PRESSURE: 80 MMHG

## 2020-10-13 DIAGNOSIS — D22.9 ATYPICAL MOLE: ICD-10-CM

## 2020-10-13 DIAGNOSIS — G56.03 BILATERAL CARPAL TUNNEL SYNDROME: ICD-10-CM

## 2020-10-13 DIAGNOSIS — D25.9 UTERINE LEIOMYOMA, UNSPECIFIED LOCATION: ICD-10-CM

## 2020-10-13 DIAGNOSIS — Z23 NEED FOR INFLUENZA VACCINATION: ICD-10-CM

## 2020-10-13 DIAGNOSIS — E87.6 HYPOKALEMIA: ICD-10-CM

## 2020-10-13 DIAGNOSIS — I10 BENIGN ESSENTIAL HYPERTENSION: Primary | ICD-10-CM

## 2020-10-13 PROBLEM — R93.89 ABNORMAL FINDING PRESENT ON DIAGNOSTIC IMAGING OF UTERUS: Status: RESOLVED | Noted: 2020-08-26 | Resolved: 2020-10-13

## 2020-10-13 PROCEDURE — 3008F BODY MASS INDEX DOCD: CPT | Performed by: FAMILY MEDICINE

## 2020-10-13 PROCEDURE — 90471 IMMUNIZATION ADMIN: CPT | Performed by: FAMILY MEDICINE

## 2020-10-13 PROCEDURE — 3079F DIAST BP 80-89 MM HG: CPT | Performed by: FAMILY MEDICINE

## 2020-10-13 PROCEDURE — 99214 OFFICE O/P EST MOD 30 MIN: CPT | Performed by: FAMILY MEDICINE

## 2020-10-13 PROCEDURE — 90686 IIV4 VACC NO PRSV 0.5 ML IM: CPT | Performed by: FAMILY MEDICINE

## 2020-10-13 PROCEDURE — 3074F SYST BP LT 130 MM HG: CPT | Performed by: FAMILY MEDICINE

## 2020-10-13 RX ORDER — POTASSIUM CHLORIDE 1500 MG/1
2 TABLET, FILM COATED, EXTENDED RELEASE ORAL DAILY
Qty: 180 TABLET | Refills: 1 | Status: SHIPPED | OUTPATIENT
Start: 2020-10-13 | End: 2021-10-13

## 2020-10-13 RX ORDER — OMEPRAZOLE 20 MG/1
20 CAPSULE, DELAYED RELEASE ORAL DAILY
COMMUNITY
Start: 2020-09-02 | End: 2020-12-01

## 2020-10-13 NOTE — PROGRESS NOTES
Jose Antonio Mcguire is a 54year old female. Patient presents with:  Medication Follow-Up  Lab: fasting  Follow - Up: from last visit on 08/26/2020      HPI:   Patient presents for recheck of her medical status especially her blood pressure and potassium. Vignesh Lozano Pt EVERY DAY 90 tablet 1   • atorvastatin 10 MG Oral Tab TAKE 1 TABLET BY MOUTH EVERY AT BEDTIME 90 tablet 1   • cetirizine (ZYRTEC ALLERGY) 10 MG Oral Tab Take 10 mg by mouth daily.  Seasonal  prn         Past Medical History:   Diagnosis Date   • Anxiety 2/2 Lymphocyte Absolute 1.55 1.00 - 4.00 x10(3) uL    Monocyte Absolute 0.63 0.10 - 1.00 x10(3) uL    Eosinophil Absolute 0.54 0.00 - 0.70 x10(3) uL    Basophil Absolute 0.06 0.00 - 0.20 x10(3) uL    Immature Granulocyte Absolute 0.04 0.00 - 1.00 x10(3) uL supplementation and check labs in a week. 5. Uterine leiomyoma, unspecified location  Plan for surgical intervention per gynecology    6.  Bilateral carpal tunnel syndrome  Discussed with patient mild symptoms discussed with her use of wrist splints luis

## 2020-10-13 NOTE — PATIENT INSTRUCTIONS
rec bilateral wrist spints for mild carple tunnel-- dax at night    contnue gyne care Dr. Rene Pickett    Dermatology eval    Continue potassium and recheck labs in a week    Flu vaccine today

## 2020-10-21 ENCOUNTER — LABORATORY ENCOUNTER (OUTPATIENT)
Dept: LAB | Age: 45
End: 2020-10-21
Attending: FAMILY MEDICINE
Payer: COMMERCIAL

## 2020-10-21 DIAGNOSIS — I10 BENIGN ESSENTIAL HYPERTENSION: ICD-10-CM

## 2020-10-21 PROCEDURE — 36415 COLL VENOUS BLD VENIPUNCTURE: CPT | Performed by: FAMILY MEDICINE

## 2020-10-21 PROCEDURE — 80048 BASIC METABOLIC PNL TOTAL CA: CPT | Performed by: FAMILY MEDICINE

## 2020-10-22 DIAGNOSIS — E87.6 HYPOKALEMIA: Primary | ICD-10-CM

## 2020-10-22 DIAGNOSIS — I10 BENIGN ESSENTIAL HYPERTENSION: ICD-10-CM

## 2020-11-03 LAB
ISOLATION GUIDELINES: NORMAL
SARS-COV-2 RNA RESP QL NAA+PROBE: NOT DETECTED
SOURCE, 2019 NOVEL CORONAVIRUS (SARS-COV-2): NORMAL

## 2020-11-04 LAB
SARS-COV-2 RNA RESP QL NAA+PROBE: NOT DETECTED
SOURCE, 2019 NOVEL CORONAVIRUS (SARS-COV-2): NORMAL

## 2020-11-05 ENCOUNTER — HOSPITAL (OUTPATIENT)
Dept: OTHER | Age: 45
End: 2020-11-05

## 2020-11-05 ENCOUNTER — HOSPITAL (OUTPATIENT)
Dept: OTHER | Age: 45
End: 2020-11-05
Attending: INTERNAL MEDICINE

## 2020-11-05 LAB
A/G RATIO_: 1.1
A/G RATIO_: 1.1
ABS LYMPH: 1.6 K/CUMM (ref 1–3.5)
ABS LYMPH: 1.6 K/CUMM (ref 1–3.5)
ABS MONO: 0.6 K/CUMM (ref 0.1–0.8)
ABS MONO: 0.6 K/CUMM (ref 0.1–0.8)
ABS NEUTRO: 3.8 K/CUMM (ref 2–8)
ABS NEUTRO: 3.8 K/CUMM (ref 2–8)
ALBUMIN: 3.6 G/DL (ref 3.5–5)
ALBUMIN: 3.6 G/DL (ref 3.5–5)
ALK PHOS: 64 UNIT/L (ref 50–124)
ALK PHOS: 64 UNIT/L (ref 50–124)
ALT/GPT: 23 UNIT/L (ref 0–55)
ALT/GPT: 23 UNIT/L (ref 0–55)
ANION GAP SERPL CALC-SCNC: 13 MEQ/L (ref 10–20)
ANION GAP SERPL CALC-SCNC: 13 MEQ/L (ref 10–20)
AST/GOT: 23 UNIT/L (ref 5–34)
AST/GOT: 23 UNIT/L (ref 5–34)
BASOPHIL: 1 % (ref 0–1)
BASOPHIL: 1 % (ref 0–1)
BILI TOTAL: 0.4 MG/DL (ref 0.2–1)
BILI TOTAL: 0.4 MG/DL (ref 0.2–1)
BUN SERPL-MCNC: 14 MG/DL (ref 6–20)
BUN SERPL-MCNC: 14 MG/DL (ref 6–20)
CALCIUM: 8.9 MG/DL (ref 8.4–10.2)
CALCIUM: 8.9 MG/DL (ref 8.4–10.2)
CHLORIDE: 105 MEQ/L (ref 97–107)
CHLORIDE: 105 MEQ/L (ref 97–107)
CONTROL LINE: PRESENT
CREATININE: 0.71 MG/DL (ref 0.6–1.3)
CREATININE: 0.71 MG/DL (ref 0.6–1.3)
DIFF_TYPE?: NORMAL
EOSINOPHIL: 3 % (ref 0–6)
EOSINOPHIL: 3 % (ref 0–6)
GLOBULIN_: 3.2 G/DL (ref 2–4.1)
GLOBULIN_: 3.2 G/DL (ref 2–4.1)
GLUCOSE LVL: 93 MG/DL (ref 70–99)
GLUCOSE LVL: 93 MG/DL (ref 70–99)
HCT VFR BLD CALC: 42 % (ref 33–45)
HCT VFR BLD CALC: 42 % (ref 33–45)
HEMOLYSIS 2+: NEGATIVE
HGB BLD-MCNC: 13.7 G/DL (ref 11–15)
HGB BLD-MCNC: 13.7 G/DL (ref 11–15)
IMMATURE GRAN: 0.2 % (ref 0–0.3)
IMMATURE GRAN: 0.2 % (ref 0–0.3)
INR: 1 (ref 0.9–1.1)
INR: 1 (ref 0.9–1.1)
INSTR WBC: 6.1 K/CUMM (ref 4–11)
LIPEMIC 3+: NEGATIVE
LYMPHOCYTE: 25 %
LYMPHOCYTE: 25 %
Lab: CLEAR
MCH RBC QN AUTO: 30 PG (ref 25–35)
MCH RBC QN AUTO: 30 PG (ref 25–35)
MCHC RBC AUTO-ENTMCNC: 33 G/DL (ref 32–37)
MCHC RBC AUTO-ENTMCNC: 33 G/DL (ref 32–37)
MCV RBC AUTO: 92 FL (ref 78–97)
MCV RBC AUTO: 92 FL (ref 78–97)
MONOCYTE: 9 %
MONOCYTE: 9 %
NEUTROPHIL: 62 %
NEUTROPHIL: 62 %
NRBC BLD MANUAL-RTO: 0 % (ref 0–0.2)
NRBC BLD MANUAL-RTO: 0 % (ref 0–0.2)
PLATELET: 307 K/CUMM (ref 150–450)
PLATELET: 307 K/CUMM (ref 150–450)
POTASSIUM: 3.6 MEQ/L (ref 3.5–5.1)
POTASSIUM: 3.6 MEQ/L (ref 3.5–5.1)
PROTHROMBIN TIME: 10.3 SECOND(S) (ref 9.7–11.6)
PROTHROMBIN TIME: 10.3 SECONDS (ref 9.7–11.6)
RBC # BLD: 4.49 M/CUMM (ref 3.7–5.2)
RBC # BLD: 4.49 M/CUMM (ref 3.7–5.2)
RDW: 12.2 % (ref 11.5–14.5)
RDW: 12.2 % (ref 11.5–14.5)
REPORT TEXT: NORMAL
SODIUM: 139 MEQ/L (ref 136–145)
SODIUM: 139 MEQ/L (ref 136–145)
TCO2: 25 MEQ/L (ref 19–29)
TCO2: 25 MEQ/L (ref 19–29)
TOTAL PROTEIN: 6.8 G/DL (ref 6.4–8.3)
TOTAL PROTEIN: 6.8 G/DL (ref 6.4–8.3)
URINE PREG POC: NEGATIVE
URINE PREG POC: NEGATIVE
WBC # BLD: 6.1 K/CUMM (ref 4–11)
WBC # BLD: 6.1 K/CUMM (ref 4–11)

## 2021-01-11 RX ORDER — ATORVASTATIN CALCIUM 10 MG/1
TABLET, FILM COATED ORAL
Qty: 90 TABLET | Refills: 2 | Status: SHIPPED | OUTPATIENT
Start: 2021-01-11 | End: 2021-01-14

## 2021-01-14 RX ORDER — ATORVASTATIN CALCIUM 10 MG/1
TABLET, FILM COATED ORAL
Qty: 90 TABLET | Refills: 2 | Status: CANCELLED | OUTPATIENT
Start: 2021-01-14

## 2021-02-15 RX ORDER — HYDROCHLOROTHIAZIDE 25 MG/1
TABLET ORAL
Qty: 90 TABLET | Refills: 1 | Status: SHIPPED | OUTPATIENT
Start: 2021-02-15 | End: 2021-08-28

## 2021-02-15 NOTE — TELEPHONE ENCOUNTER
Future appt:    Last Appointment with provider:   10/13/2020  Last appointment at [facility]:  10/13/2020  Last px: 2/26/2020    HYDROCHLOROTHIAZIDE 25 MG Oral Tab #90 with 1 refill, pt to take 1 tab po daily, last refilled on 7/22/2020    Cholesterol, T

## 2021-03-12 ENCOUNTER — LABORATORY ENCOUNTER (OUTPATIENT)
Dept: LAB | Age: 46
End: 2021-03-12
Attending: FAMILY MEDICINE
Payer: COMMERCIAL

## 2021-03-12 DIAGNOSIS — E87.6 HYPOKALEMIA: ICD-10-CM

## 2021-03-12 DIAGNOSIS — I10 BENIGN ESSENTIAL HYPERTENSION: ICD-10-CM

## 2021-03-12 LAB
ANION GAP SERPL CALC-SCNC: 3 MMOL/L (ref 0–18)
BUN BLD-MCNC: 17 MG/DL (ref 7–18)
BUN/CREAT SERPL: 23.9 (ref 10–20)
CALCIUM BLD-MCNC: 8.9 MG/DL (ref 8.5–10.1)
CHLORIDE SERPL-SCNC: 106 MMOL/L (ref 98–112)
CO2 SERPL-SCNC: 29 MMOL/L (ref 21–32)
CREAT BLD-MCNC: 0.71 MG/DL
GLUCOSE BLD-MCNC: 83 MG/DL (ref 70–99)
OSMOLALITY SERPL CALC.SUM OF ELEC: 287 MOSM/KG (ref 275–295)
PATIENT FASTING Y/N/NP: YES
POTASSIUM SERPL-SCNC: 3.8 MMOL/L (ref 3.5–5.1)
SODIUM SERPL-SCNC: 138 MMOL/L (ref 136–145)

## 2021-03-12 PROCEDURE — 80048 BASIC METABOLIC PNL TOTAL CA: CPT | Performed by: FAMILY MEDICINE

## 2021-03-12 PROCEDURE — 36415 COLL VENOUS BLD VENIPUNCTURE: CPT | Performed by: FAMILY MEDICINE

## 2021-08-28 RX ORDER — HYDROCHLOROTHIAZIDE 25 MG/1
TABLET ORAL
Qty: 90 TABLET | Refills: 0 | Status: SHIPPED | OUTPATIENT
Start: 2021-08-28 | End: 2021-11-22

## 2021-08-28 NOTE — TELEPHONE ENCOUNTER
Pt contacted. Pt scheduled appt. Pt asked if labs needed prior to her appt.   Pt has 3 tablets of hydrochlorothiazide left    Future Appointments   Date Time Provider Jeronimo Stephy   10/13/2021  9:00 AM Huma Quintana MD [facility]

## 2021-08-28 NOTE — TELEPHONE ENCOUNTER
Future appt:    Last Appointment with provider:   Visit date not found  Last appointment at [facility]:  Visit date not found  Last office visit: 10/13/20  Last px: 2/26/20    hydrochlorothiazide refilled on 2/15/21 for #90, 1 refill    Pt due for amelia

## 2021-10-13 ENCOUNTER — TELEPHONE (OUTPATIENT)
Dept: FAMILY MEDICINE CLINIC | Facility: CLINIC | Age: 46
End: 2021-10-13

## 2021-10-13 ENCOUNTER — LAB ENCOUNTER (OUTPATIENT)
Dept: LAB | Age: 46
End: 2021-10-13
Attending: FAMILY MEDICINE
Payer: COMMERCIAL

## 2021-10-13 ENCOUNTER — OFFICE VISIT (OUTPATIENT)
Dept: FAMILY MEDICINE CLINIC | Facility: CLINIC | Age: 46
End: 2021-10-13
Payer: COMMERCIAL

## 2021-10-13 VITALS
HEIGHT: 64 IN | RESPIRATION RATE: 16 BRPM | BODY MASS INDEX: 35 KG/M2 | TEMPERATURE: 99 F | HEART RATE: 80 BPM | DIASTOLIC BLOOD PRESSURE: 88 MMHG | WEIGHT: 205 LBS | SYSTOLIC BLOOD PRESSURE: 136 MMHG

## 2021-10-13 DIAGNOSIS — E87.6 HYPOKALEMIA: ICD-10-CM

## 2021-10-13 DIAGNOSIS — N60.12 FIBROCYSTIC DISEASE OF LEFT BREAST: ICD-10-CM

## 2021-10-13 DIAGNOSIS — I10 BENIGN ESSENTIAL HYPERTENSION: ICD-10-CM

## 2021-10-13 DIAGNOSIS — R13.19 ESOPHAGEAL DYSPHAGIA: ICD-10-CM

## 2021-10-13 DIAGNOSIS — F41.9 ANXIETY: ICD-10-CM

## 2021-10-13 DIAGNOSIS — E55.9 VITAMIN D DEFICIENCY: ICD-10-CM

## 2021-10-13 DIAGNOSIS — Z00.00 ANNUAL PHYSICAL EXAM: Primary | ICD-10-CM

## 2021-10-13 PROBLEM — N94.6 DYSMENORRHEA: Status: ACTIVE | Noted: 2020-11-23

## 2021-10-13 PROCEDURE — 3008F BODY MASS INDEX DOCD: CPT | Performed by: FAMILY MEDICINE

## 2021-10-13 PROCEDURE — 81001 URINALYSIS AUTO W/SCOPE: CPT | Performed by: FAMILY MEDICINE

## 2021-10-13 PROCEDURE — 3079F DIAST BP 80-89 MM HG: CPT | Performed by: FAMILY MEDICINE

## 2021-10-13 PROCEDURE — 80061 LIPID PANEL: CPT | Performed by: FAMILY MEDICINE

## 2021-10-13 PROCEDURE — 80050 GENERAL HEALTH PANEL: CPT | Performed by: FAMILY MEDICINE

## 2021-10-13 PROCEDURE — 82306 VITAMIN D 25 HYDROXY: CPT | Performed by: FAMILY MEDICINE

## 2021-10-13 PROCEDURE — 3075F SYST BP GE 130 - 139MM HG: CPT | Performed by: FAMILY MEDICINE

## 2021-10-13 PROCEDURE — 99396 PREV VISIT EST AGE 40-64: CPT | Performed by: FAMILY MEDICINE

## 2021-10-13 RX ORDER — ATORVASTATIN CALCIUM 10 MG/1
10 TABLET, FILM COATED ORAL NIGHTLY
COMMUNITY
Start: 2021-09-23 | End: 2022-01-31

## 2021-10-13 RX ORDER — ERGOCALCIFEROL 1.25 MG/1
50000 CAPSULE ORAL WEEKLY
Qty: 12 CAPSULE | Refills: 0 | Status: SHIPPED | OUTPATIENT
Start: 2021-10-13 | End: 2021-12-30

## 2021-10-13 RX ORDER — POTASSIUM CHLORIDE 1500 MG/1
2 TABLET, FILM COATED, EXTENDED RELEASE ORAL DAILY
Qty: 180 TABLET | Refills: 1 | Status: SHIPPED | OUTPATIENT
Start: 2021-10-13

## 2021-10-13 NOTE — PROGRESS NOTES
CC: Annual Physical Exam    HPI:   Thompson Tovar is a 51year old female who presents for a complete physical exam.     MIL hospitalized last night, poor sleep.    No chest pain, sob  No muscle aches  Pt fasting for labs    Had covid April 2021    [place] Essential hypertension    • Hyperlipidemia       Past Surgical History:   Procedure Laterality Date   •   ,      • LAP ABLAT UTERINE FIBROIDS     • LUMPECTOMY LEFT  2014      Family History   Problem Relation Age of Onset   • Cancer Fa stiffness. NEUROLOGICAL:  Denies headache, seizures, dizziness, syncope, paralysis, ataxia, numbness or tingling in the extremities,change in bowel or bladder control. HEMATOLOGIC:  Denies anemia, bleeding or bruising.   LYMPHATICS:  Denies enlarged nodes FROM, 2+ dorsalis pedis pulses bilaterally. NEURO:  No deficit, normal gait, strength and tone, sensory intact, normal reflexes  PSYCH:  Normal mood and affect.  Behavior is normal. Judgement and thought content are normal.    ASSESSMENT AND PLAN:   Umm Sneed to stress of mother-in-law's hospitalization overnight. Patient will monitor    6. Fibrocystic disease of left breast  Yearly mammogram advised and planned      Order for mammogram and dexascan  Self Breast exam reviewed.   Health maintenance, will check:

## 2021-10-13 NOTE — TELEPHONE ENCOUNTER
----- Message from Luis Briseno MD sent at 10/13/2021  5:37 PM CDT -----  Oratory results reviewed. Patient vitamin D level quite low at 22. Patient  Chemistry profile does show potassium low at 3.3.   Patient recommended to increase her potassium t

## 2021-10-13 NOTE — PATIENT INSTRUCTIONS
Recommend Covid vaccine    Recommend flu vaccine     recommend mammogram    Fasting labs today    Continue present medications

## 2021-11-22 RX ORDER — HYDROCHLOROTHIAZIDE 25 MG/1
TABLET ORAL
Qty: 90 TABLET | Refills: 0 | Status: SHIPPED | OUTPATIENT
Start: 2021-11-22

## 2021-11-22 NOTE — TELEPHONE ENCOUNTER
Future appt:     Your appointments     Date & Time Appointment Department [facility])    Jan 28, 2022  8:00 AM CST Follow up Visit with Hemalatha Bauer MD [address] [facility] ([facility])    Contact your primary

## 2021-12-23 DIAGNOSIS — E55.9 VITAMIN D DEFICIENCY: ICD-10-CM

## 2021-12-23 RX ORDER — ERGOCALCIFEROL 1.25 MG/1
CAPSULE ORAL
Qty: 12 CAPSULE | Refills: 0 | OUTPATIENT
Start: 2021-12-23

## 2022-01-28 ENCOUNTER — LAB ENCOUNTER (OUTPATIENT)
Dept: LAB | Age: 47
End: 2022-01-28
Attending: FAMILY MEDICINE
Payer: COMMERCIAL

## 2022-01-28 ENCOUNTER — OFFICE VISIT (OUTPATIENT)
Dept: FAMILY MEDICINE CLINIC | Facility: CLINIC | Age: 47
End: 2022-01-28
Payer: COMMERCIAL

## 2022-01-28 VITALS
DIASTOLIC BLOOD PRESSURE: 82 MMHG | HEART RATE: 68 BPM | SYSTOLIC BLOOD PRESSURE: 128 MMHG | TEMPERATURE: 98 F | BODY MASS INDEX: 33.84 KG/M2 | RESPIRATION RATE: 12 BRPM | WEIGHT: 198.19 LBS | HEIGHT: 64 IN

## 2022-01-28 DIAGNOSIS — E55.9 VITAMIN D DEFICIENCY: ICD-10-CM

## 2022-01-28 DIAGNOSIS — I10 BENIGN ESSENTIAL HYPERTENSION: Primary | ICD-10-CM

## 2022-01-28 DIAGNOSIS — Z91.89 AT RISK FOR SLEEP APNEA: ICD-10-CM

## 2022-01-28 DIAGNOSIS — R53.83 OTHER FATIGUE: ICD-10-CM

## 2022-01-28 DIAGNOSIS — E87.6 HYPOKALEMIA: ICD-10-CM

## 2022-01-28 LAB
ANION GAP SERPL CALC-SCNC: 5 MMOL/L (ref 0–18)
BUN BLD-MCNC: 24 MG/DL (ref 7–18)
CALCIUM BLD-MCNC: 9.1 MG/DL (ref 8.5–10.1)
CHLORIDE SERPL-SCNC: 107 MMOL/L (ref 98–112)
CO2 SERPL-SCNC: 28 MMOL/L (ref 21–32)
CREAT BLD-MCNC: 0.81 MG/DL
FASTING STATUS PATIENT QL REPORTED: YES
GLUCOSE BLD-MCNC: 87 MG/DL (ref 70–99)
OSMOLALITY SERPL CALC.SUM OF ELEC: 293 MOSM/KG (ref 275–295)
POTASSIUM SERPL-SCNC: 4.2 MMOL/L (ref 3.5–5.1)
SODIUM SERPL-SCNC: 140 MMOL/L (ref 136–145)
VIT B12 SERPL-MCNC: 568 PG/ML (ref 193–986)
VIT D+METAB SERPL-MCNC: 34.5 NG/ML (ref 30–100)

## 2022-01-28 PROCEDURE — 3079F DIAST BP 80-89 MM HG: CPT | Performed by: FAMILY MEDICINE

## 2022-01-28 PROCEDURE — 82306 VITAMIN D 25 HYDROXY: CPT | Performed by: FAMILY MEDICINE

## 2022-01-28 PROCEDURE — 99214 OFFICE O/P EST MOD 30 MIN: CPT | Performed by: FAMILY MEDICINE

## 2022-01-28 PROCEDURE — 82607 VITAMIN B-12: CPT | Performed by: FAMILY MEDICINE

## 2022-01-28 PROCEDURE — 3008F BODY MASS INDEX DOCD: CPT | Performed by: FAMILY MEDICINE

## 2022-01-28 PROCEDURE — 3074F SYST BP LT 130 MM HG: CPT | Performed by: FAMILY MEDICINE

## 2022-01-28 PROCEDURE — 80048 BASIC METABOLIC PNL TOTAL CA: CPT | Performed by: FAMILY MEDICINE

## 2022-01-28 NOTE — PROGRESS NOTES
[facility] [place]  PROGRESS NOTE  Chief Complaint:   Patient presents with:  Medication Follow-Up      HPI:   This is a 51year old female here for medical follow-up.     Pt noting continued fatigue  Pt noting sleep 12 hour and still needs to na Clear Clear    Spec Gravity 1.011 1.001 - 1.030    Glucose Urine Negative Negative mg/dL    Bilirubin Urine Negative Negative    Ketones Urine Negative Negative mg/dL    Blood Urine Small (A) Negative    pH Urine 7.0 5.0 - 8.0    Protein Urine Negative Neg Tobacco Use      Smoking status: Former Smoker        Types: Cigarettes        Quit date: 2004        Years since quittin.0      Smokeless tobacco: Never Used    Vaping Use      Vaping Use: Never used    Alcohol use:  Yes      Alcohol/week: 0.0 sta anemia, bleeding or bruising. PSYCHIATRIC:  Denies depression or anxiety. ENDOCRINOLOGIC:  Denies excessive sweating, cold or heat intolerance, polyuria or polydipsia.       EXAM:   /82 (BP Location: Right arm, Patient Position: Sitting, Cuff Size: learning. Medical education done. Outcome: Patient verbalizes understanding. Patient is notified to call with any questions, complications, allergies, or worsening or changing symptoms.   Patient is to call with any side effects or complications from the

## 2022-01-31 ENCOUNTER — TELEPHONE (OUTPATIENT)
Dept: FAMILY MEDICINE CLINIC | Facility: CLINIC | Age: 47
End: 2022-01-31

## 2022-01-31 RX ORDER — ATORVASTATIN CALCIUM 10 MG/1
TABLET, FILM COATED ORAL
Qty: 90 TABLET | Refills: 3 | Status: SHIPPED | OUTPATIENT
Start: 2022-01-31

## 2022-01-31 NOTE — TELEPHONE ENCOUNTER
----- Message from Donal Mcgowan MD sent at 1/28/2022  7:34 PM CST -----  Laboratory results reviewed. Patient B12 level 568 normal. Patient chemistry profile shows normalized potassium level. Kidney function normal. Vitamin D level improved to 34.5.

## 2022-01-31 NOTE — TELEPHONE ENCOUNTER
Future appt:    Last Appointment with provider:   1/28/2022 for HTN follow up.   Last appointment at [facility]:  1/28/2022  Cholesterol, Total (mg/dL)   Date Value   10/13/2021 133     HDL Cholesterol (mg/dL)   Date Value   10/13/2021 50     LDL Choleste

## 2022-03-01 RX ORDER — HYDROCHLOROTHIAZIDE 25 MG/1
TABLET ORAL
Qty: 90 TABLET | Refills: 3 | Status: SHIPPED | OUTPATIENT
Start: 2022-03-01

## 2022-03-01 NOTE — TELEPHONE ENCOUNTER
Future appt:    Last Appointment with provider:   1/28/2022 for medication follow up, HTN. Last physical was 10/13/21. Last appointment at [facility]:  1/28/2022  Cholesterol, Total (mg/dL)   Date Value   10/13/2021 133     HDL Cholesterol (mg/dL)   Date Value   10/13/2021 50     LDL Cholesterol (mg/dL)   Date Value   10/13/2021 71     Triglycerides (mg/dL)   Date Value   10/13/2021 56     No results found for: EAG, A1C  Lab Results   Component Value Date    T4F 1.1 02/20/2019    TSH 3.630 10/13/2021       No follow-ups on file.

## 2022-03-17 ENCOUNTER — HOSPITAL ENCOUNTER (OUTPATIENT)
Dept: GENERAL RADIOLOGY | Age: 47
Discharge: HOME | End: 2022-03-17
Attending: NURSE PRACTITIONER
Payer: COMMERCIAL

## 2022-03-17 ENCOUNTER — OFFICE VISIT (OUTPATIENT)
Dept: FAMILY MEDICINE CLINIC | Facility: CLINIC | Age: 47
End: 2022-03-17
Payer: COMMERCIAL

## 2022-03-17 ENCOUNTER — TELEPHONE (OUTPATIENT)
Dept: FAMILY MEDICINE CLINIC | Facility: CLINIC | Age: 47
End: 2022-03-17

## 2022-03-17 VITALS
SYSTOLIC BLOOD PRESSURE: 120 MMHG | BODY MASS INDEX: 34.31 KG/M2 | TEMPERATURE: 99 F | WEIGHT: 201 LBS | OXYGEN SATURATION: 98 % | RESPIRATION RATE: 18 BRPM | DIASTOLIC BLOOD PRESSURE: 86 MMHG | HEIGHT: 64 IN | HEART RATE: 87 BPM

## 2022-03-17 DIAGNOSIS — S79.911A INJURY OF RIGHT HIP, INITIAL ENCOUNTER: ICD-10-CM
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
## 2022-03-17 DIAGNOSIS — T14.8XXA HEMATOMA: ICD-10-CM
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
## 2022-03-17 PROCEDURE — 73502 X-RAY EXAM HIP UNI 2-3 VIEWS: CPT | Performed by: NURSE PRACTITIONER

## 2022-03-17 PROCEDURE — 3074F SYST BP LT 130 MM HG: CPT | Performed by: NURSE PRACTITIONER

## 2022-03-17 PROCEDURE — 3008F BODY MASS INDEX DOCD: CPT | Performed by: NURSE PRACTITIONER

## 2022-03-17 PROCEDURE — 99214 OFFICE O/P EST MOD 30 MIN: CPT | Performed by: NURSE PRACTITIONER

## 2022-03-17 PROCEDURE — 3079F DIAST BP 80-89 MM HG: CPT | Performed by: NURSE PRACTITIONER

## 2022-03-17 NOTE — PATIENT INSTRUCTIONS
Rest, apply ice/heat to the bruised area, you may take extra strength Tylenol for pain. The radiologist will review your x-ray films results should be back this afternoon. Ortho referral- Follow-up with symptoms persist or increase.

## 2022-03-17 NOTE — TELEPHONE ENCOUNTER
----- Message from ARELI Koch sent at 3/17/2022  4:52 PM CDT -----  Please make sure patient receives my chart message as below-  The radiologist agrees that there is no fracture to your hip-there is mild arthritis noted. Follow-up with treatment plan as discussed at your appointment.   ARELI Koch, FNP-BC

## 2022-08-08 DIAGNOSIS — E87.6 HYPOKALEMIA: ICD-10-CM

## 2022-08-08 RX ORDER — POTASSIUM CHLORIDE 1500 MG/1
2 TABLET, FILM COATED, EXTENDED RELEASE ORAL DAILY
Qty: 180 TABLET | Refills: 1 | Status: SHIPPED | OUTPATIENT
Start: 2022-08-08

## 2022-08-08 NOTE — TELEPHONE ENCOUNTER
Future appt:    Last Appointment with provider:   Visit date not found  Last appointment at [facility]:  3/17/2022  Cholesterol, Total (mg/dL)   Date Value   10/13/2021 133     HDL Cholesterol (mg/dL)   Date Value   10/13/2021 50     LDL Cholesterol (mg/dL)   Date Value   10/13/2021 71     Triglycerides (mg/dL)   Date Value   10/13/2021 56     No results found for: EAG, A1C  Lab Results   Component Value Date    T4F 1.1 02/20/2019    TSH 3.630 10/13/2021       No follow-ups on file.   Last rf 10/23/21    No follow up noted     Last appt- hypertension 1/28/222

## 2022-09-28 ENCOUNTER — OFFICE VISIT (OUTPATIENT)
Dept: FAMILY MEDICINE CLINIC | Facility: CLINIC | Age: 47
End: 2022-09-28

## 2022-09-28 VITALS
HEART RATE: 70 BPM | HEIGHT: 63.5 IN | WEIGHT: 209.81 LBS | RESPIRATION RATE: 16 BRPM | DIASTOLIC BLOOD PRESSURE: 76 MMHG | TEMPERATURE: 98 F | SYSTOLIC BLOOD PRESSURE: 112 MMHG | BODY MASS INDEX: 36.72 KG/M2

## 2022-09-28 DIAGNOSIS — I10 BENIGN ESSENTIAL HYPERTENSION: Primary | ICD-10-CM

## 2022-09-28 DIAGNOSIS — G56.03 BILATERAL CARPAL TUNNEL SYNDROME: ICD-10-CM

## 2022-09-28 DIAGNOSIS — R23.2 HOT FLASHES: ICD-10-CM

## 2022-09-28 DIAGNOSIS — E55.9 VITAMIN D DEFICIENCY: ICD-10-CM

## 2022-09-28 DIAGNOSIS — E87.6 HYPOKALEMIA: ICD-10-CM

## 2022-09-28 DIAGNOSIS — R53.83 OTHER FATIGUE: ICD-10-CM

## 2022-09-28 DIAGNOSIS — Z91.89 AT RISK FOR SLEEP APNEA: ICD-10-CM

## 2022-09-28 PROCEDURE — 3008F BODY MASS INDEX DOCD: CPT | Performed by: FAMILY MEDICINE

## 2022-09-28 PROCEDURE — 3074F SYST BP LT 130 MM HG: CPT | Performed by: FAMILY MEDICINE

## 2022-09-28 PROCEDURE — 3078F DIAST BP <80 MM HG: CPT | Performed by: FAMILY MEDICINE

## 2022-09-28 PROCEDURE — 99214 OFFICE O/P EST MOD 30 MIN: CPT | Performed by: FAMILY MEDICINE

## 2022-09-28 NOTE — PATIENT INSTRUCTIONS
rec sleep eval    rec wrist splint nightly bilaterally    rec fasting labs    Encourage exercise    Hydrate well

## 2022-10-04 ENCOUNTER — LABORATORY ENCOUNTER (OUTPATIENT)
Dept: LAB | Age: 47
End: 2022-10-04
Attending: FAMILY MEDICINE
Payer: COMMERCIAL

## 2022-10-04 DIAGNOSIS — E55.9 VITAMIN D DEFICIENCY: ICD-10-CM

## 2022-10-04 DIAGNOSIS — I10 BENIGN ESSENTIAL HYPERTENSION: ICD-10-CM

## 2022-10-04 DIAGNOSIS — R23.2 HOT FLASHES: ICD-10-CM

## 2022-10-04 DIAGNOSIS — E87.6 HYPOKALEMIA: ICD-10-CM

## 2022-10-04 LAB
ALBUMIN SERPL-MCNC: 3.4 G/DL (ref 3.4–5)
ALBUMIN/GLOB SERPL: 1 {RATIO} (ref 1–2)
ALP LIVER SERPL-CCNC: 57 U/L
ALT SERPL-CCNC: 31 U/L
ANION GAP SERPL CALC-SCNC: 4 MMOL/L (ref 0–18)
AST SERPL-CCNC: 25 U/L (ref 15–37)
BASOPHILS # BLD AUTO: 0.04 X10(3) UL (ref 0–0.2)
BASOPHILS NFR BLD AUTO: 0.9 %
BILIRUB SERPL-MCNC: 0.4 MG/DL (ref 0.1–2)
BILIRUB UR QL STRIP.AUTO: NEGATIVE
BUN BLD-MCNC: 18 MG/DL (ref 7–18)
CALCIUM BLD-MCNC: 9.1 MG/DL (ref 8.5–10.1)
CHLORIDE SERPL-SCNC: 106 MMOL/L (ref 98–112)
CHOLEST SERPL-MCNC: 156 MG/DL (ref ?–200)
CO2 SERPL-SCNC: 28 MMOL/L (ref 21–32)
COLOR UR AUTO: YELLOW
CREAT BLD-MCNC: 0.81 MG/DL
EOSINOPHIL # BLD AUTO: 0.32 X10(3) UL (ref 0–0.7)
EOSINOPHIL NFR BLD AUTO: 6.9 %
ERYTHROCYTE [DISTWIDTH] IN BLOOD BY AUTOMATED COUNT: 12.6 %
ESTRADIOL SERPL-MCNC: 95 PG/ML
FASTING PATIENT LIPID ANSWER: YES
FASTING STATUS PATIENT QL REPORTED: YES
FSH SERPL-ACNC: 7.2 MIU/ML
GFR SERPLBLD BASED ON 1.73 SQ M-ARVRAT: 89 ML/MIN/1.73M2 (ref 60–?)
GLOBULIN PLAS-MCNC: 3.4 G/DL (ref 2.8–4.4)
GLUCOSE BLD-MCNC: 89 MG/DL (ref 70–99)
GLUCOSE UR STRIP.AUTO-MCNC: NEGATIVE MG/DL
HCT VFR BLD AUTO: 44 %
HDLC SERPL-MCNC: 66 MG/DL (ref 40–59)
HGB BLD-MCNC: 14.4 G/DL
IMM GRANULOCYTES # BLD AUTO: 0.01 X10(3) UL (ref 0–1)
IMM GRANULOCYTES NFR BLD: 0.2 %
KETONES UR STRIP.AUTO-MCNC: NEGATIVE MG/DL
LDLC SERPL CALC-MCNC: 74 MG/DL (ref ?–100)
LEUKOCYTE ESTERASE UR QL STRIP.AUTO: NEGATIVE
LH SERPL-ACNC: 17.7 MIU/ML
LYMPHOCYTES # BLD AUTO: 1.37 X10(3) UL (ref 1–4)
LYMPHOCYTES NFR BLD AUTO: 29.7 %
MAGNESIUM SERPL-MCNC: 2.1 MG/DL (ref 1.6–2.6)
MCH RBC QN AUTO: 31 PG (ref 26–34)
MCHC RBC AUTO-ENTMCNC: 32.7 G/DL (ref 31–37)
MCV RBC AUTO: 94.6 FL
MONOCYTES # BLD AUTO: 0.35 X10(3) UL (ref 0.1–1)
MONOCYTES NFR BLD AUTO: 7.6 %
NEUTROPHILS # BLD AUTO: 2.53 X10 (3) UL (ref 1.5–7.7)
NEUTROPHILS # BLD AUTO: 2.53 X10(3) UL (ref 1.5–7.7)
NEUTROPHILS NFR BLD AUTO: 54.7 %
NITRITE UR QL STRIP.AUTO: NEGATIVE
NONHDLC SERPL-MCNC: 90 MG/DL (ref ?–130)
OSMOLALITY SERPL CALC.SUM OF ELEC: 287 MOSM/KG (ref 275–295)
PH UR STRIP.AUTO: 8 [PH] (ref 5–8)
PLATELET # BLD AUTO: 310 10(3)UL (ref 150–450)
POTASSIUM SERPL-SCNC: 3.4 MMOL/L (ref 3.5–5.1)
PROT SERPL-MCNC: 6.8 G/DL (ref 6.4–8.2)
PROT UR STRIP.AUTO-MCNC: 30 MG/DL
RBC # BLD AUTO: 4.65 X10(6)UL
RBC UR QL AUTO: NEGATIVE
SODIUM SERPL-SCNC: 138 MMOL/L (ref 136–145)
SP GR UR STRIP.AUTO: 1.02 (ref 1–1.03)
TRIGL SERPL-MCNC: 84 MG/DL (ref 30–149)
TSI SER-ACNC: 2.76 MIU/ML (ref 0.36–3.74)
UROBILINOGEN UR STRIP.AUTO-MCNC: <2 MG/DL
VIT D+METAB SERPL-MCNC: 55.4 NG/ML (ref 30–100)
VLDLC SERPL CALC-MCNC: 13 MG/DL (ref 0–30)
WBC # BLD AUTO: 4.6 X10(3) UL (ref 4–11)

## 2022-10-04 PROCEDURE — 83001 ASSAY OF GONADOTROPIN (FSH): CPT | Performed by: FAMILY MEDICINE

## 2022-10-04 PROCEDURE — 81001 URINALYSIS AUTO W/SCOPE: CPT | Performed by: FAMILY MEDICINE

## 2022-10-04 PROCEDURE — 82306 VITAMIN D 25 HYDROXY: CPT | Performed by: FAMILY MEDICINE

## 2022-10-04 PROCEDURE — 83002 ASSAY OF GONADOTROPIN (LH): CPT | Performed by: FAMILY MEDICINE

## 2022-10-04 PROCEDURE — 80061 LIPID PANEL: CPT | Performed by: FAMILY MEDICINE

## 2022-10-04 PROCEDURE — 83735 ASSAY OF MAGNESIUM: CPT | Performed by: FAMILY MEDICINE

## 2022-10-04 PROCEDURE — 80050 GENERAL HEALTH PANEL: CPT | Performed by: FAMILY MEDICINE

## 2022-10-04 PROCEDURE — 82670 ASSAY OF TOTAL ESTRADIOL: CPT | Performed by: FAMILY MEDICINE

## 2023-03-11 RX ORDER — HYDROCHLOROTHIAZIDE 25 MG/1
TABLET ORAL
Qty: 90 TABLET | Refills: 0 | Status: SHIPPED | OUTPATIENT
Start: 2023-03-11

## 2023-03-11 NOTE — TELEPHONE ENCOUNTER
Future appt:    Last Appointment with provider:   9/28/2022; No f/u recommended    Last appointment at [facility]:  9/28/2022  Cholesterol, Total (mg/dL)   Date Value   10/04/2022 156     HDL Cholesterol (mg/dL)   Date Value   10/04/2022 66 (H)     LDL Cholesterol (mg/dL)   Date Value   10/04/2022 74     Triglycerides (mg/dL)   Date Value   10/04/2022 84     No results found for: EAG, A1C  Lab Results   Component Value Date    T4F 1.1 02/20/2019    TSH 2.760 10/04/2022     Last RF:  3/1/2022    No follow-ups on file.

## 2023-04-09 DIAGNOSIS — E87.6 HYPOKALEMIA: ICD-10-CM

## 2023-04-10 RX ORDER — POTASSIUM CHLORIDE 1500 MG/1
2 TABLET, FILM COATED, EXTENDED RELEASE ORAL DAILY
Qty: 180 TABLET | Refills: 0 | Status: SHIPPED | OUTPATIENT
Start: 2023-04-10

## 2023-04-10 NOTE — TELEPHONE ENCOUNTER
Future appt:    Last Appointment with provider:  9/28/2022; No f/u recommended    Last appointment at [facility]:  Visit date not found  Cholesterol, Total (mg/dL)   Date Value   10/04/2022 156     HDL Cholesterol (mg/dL)   Date Value   10/04/2022 66 (H)     LDL Cholesterol (mg/dL)   Date Value   10/04/2022 74     Triglycerides (mg/dL)   Date Value   10/04/2022 84     No results found for: EAG, A1C  Lab Results   Component Value Date    T4F 1.1 02/20/2019    TSH 2.760 10/04/2022     Last RF:  8/8/2022    No follow-ups on file.

## 2023-05-03 NOTE — PROGRESS NOTES
HPI:   Subjective Patient presents with:  Blood Pressure: f/u     Cheryle Rex is a 38year old female who presents for follow-up of elevated blood pressure. She is not exercising and is not adherent to a low-salt diet.  Blood pressure is well controlled a her mother; Prostate Cancer in her father. She  reports that she quit smoking about 15 years ago. Her smoking use included cigarettes. She has never used smokeless tobacco. She reports that she drinks alcohol. She reports that she does not use drugs. Estimated body mass index is 36.15 kg/m² as calculated from the following:    Height as of this encounter: 64\". Weight as of this encounter: 210 lb 9.6 oz.    GENERAL: well developed, well nourished,in no apparent distress  SKIN: no rashes,no suspiciou [FreeTextEntry1] : Pt encounter incorporated clinical review of the medical record including consultation from specialists, review of lab and diagnostic testing with interpretation and discussion of results with patient, general pt counseling, and coordination of care, as well as documentation update within the electronic medical record.\par \par Time spent: 30 mins.\par

## 2023-05-08 RX ORDER — ATORVASTATIN CALCIUM 10 MG/1
TABLET, FILM COATED ORAL
Qty: 90 TABLET | Refills: 3 | Status: SHIPPED | OUTPATIENT
Start: 2023-05-08

## 2023-05-08 NOTE — TELEPHONE ENCOUNTER
Future appt: Your appointments     Date & Time Appointment Department [facility])    Jun 14, 2023  3:30 PM CDT Follow Up Visit with Padilla Soto MD [address] [facility], [place] ([facility])            [address] [facility], [facility]  [phone]        Last Appointment with provider:  9/28/22 for HTN and medication follow up. BW on 10/4/22. Last appointment at [facility]:  Visit date not found  Cholesterol, Total (mg/dL)   Date Value   10/04/2022 156     HDL Cholesterol (mg/dL)   Date Value   10/04/2022 66 (H)     LDL Cholesterol (mg/dL)   Date Value   10/04/2022 74     Triglycerides (mg/dL)   Date Value   10/04/2022 84     No results found for: EAG, A1C  Lab Results   Component Value Date    T4F 1.1 02/20/2019    TSH 2.760 10/04/2022       No follow-ups on file.

## 2023-06-13 RX ORDER — HYDROCHLOROTHIAZIDE 25 MG/1
TABLET ORAL
Qty: 90 TABLET | Refills: 0 | Status: SHIPPED | OUTPATIENT
Start: 2023-06-13

## 2023-06-13 NOTE — TELEPHONE ENCOUNTER
Future appt: Your appointments     Date & Time Appointment Department [facility])    Jun 14, 2023  3:30 PM CDT Follow Up Visit with Ana Ulrich MD [address], [place] ([facility])            [address], [facility] [id number]  [phone]        Last Appointment with provider: 9/28/22 HTN follow up  [place] appointment [place] [facility]:  Visit date not found  Cholesterol, Total (mg/dL)   Date Value   10/04/2022 156     HDL Cholesterol (mg/dL)   Date Value   10/04/2022 66 (H)     LDL Cholesterol (mg/dL)   Date Value   10/04/2022 74     Triglycerides (mg/dL)   Date Value   10/04/2022 84     No results found for: EAG, A1C  Lab Results   Component Value Date    T4F 1.1 02/20/2019    TSH 2.760 10/04/2022       No follow-ups on file.

## 2023-06-14 ENCOUNTER — OFFICE VISIT (OUTPATIENT)
Dept: FAMILY MEDICINE CLINIC | Facility: CLINIC | Age: 48
End: 2023-06-14
Payer: COMMERCIAL

## 2023-06-14 VITALS
HEIGHT: 63.5 IN | HEART RATE: 87 BPM | SYSTOLIC BLOOD PRESSURE: 126 MMHG | RESPIRATION RATE: 16 BRPM | OXYGEN SATURATION: 98 % | DIASTOLIC BLOOD PRESSURE: 78 MMHG | TEMPERATURE: 99 F | WEIGHT: 214.19 LBS | BODY MASS INDEX: 37.48 KG/M2

## 2023-06-14 DIAGNOSIS — R06.83 SNORING: ICD-10-CM

## 2023-06-14 DIAGNOSIS — F41.9 ANXIETY: ICD-10-CM

## 2023-06-14 DIAGNOSIS — Z00.00 ANNUAL PHYSICAL EXAM: Primary | ICD-10-CM

## 2023-06-14 DIAGNOSIS — E55.9 VITAMIN D DEFICIENCY: ICD-10-CM

## 2023-06-14 DIAGNOSIS — E78.2 MIXED HYPERLIPIDEMIA: ICD-10-CM

## 2023-06-14 DIAGNOSIS — R53.83 FATIGUE, UNSPECIFIED TYPE: ICD-10-CM

## 2023-06-14 DIAGNOSIS — I10 BENIGN ESSENTIAL HYPERTENSION: ICD-10-CM

## 2023-06-14 PROBLEM — N94.6 DYSMENORRHEA: Status: RESOLVED | Noted: 2020-11-23 | Resolved: 2023-06-14

## 2023-06-14 PROBLEM — Z91.89 AT RISK FOR SLEEP APNEA: Status: RESOLVED | Noted: 2022-01-28 | Resolved: 2023-06-14

## 2023-06-14 PROBLEM — D25.9 UTERINE FIBROID: Status: RESOLVED | Noted: 2017-02-08 | Resolved: 2023-06-14

## 2023-06-14 PROCEDURE — 3074F SYST BP LT 130 MM HG: CPT | Performed by: FAMILY MEDICINE

## 2023-06-14 PROCEDURE — 3008F BODY MASS INDEX DOCD: CPT | Performed by: FAMILY MEDICINE

## 2023-06-14 PROCEDURE — 3078F DIAST BP <80 MM HG: CPT | Performed by: FAMILY MEDICINE

## 2023-06-14 PROCEDURE — 99396 PREV VISIT EST AGE 40-64: CPT | Performed by: FAMILY MEDICINE

## 2023-06-14 NOTE — PATIENT INSTRUCTIONS
Rec sleep eval --Brenda Estevez    Rec fasting labs    Continue medication    Continue diet/ exercise program

## 2023-06-16 ENCOUNTER — LABORATORY ENCOUNTER (OUTPATIENT)
Dept: LAB | Age: 48
End: 2023-06-16
Attending: FAMILY MEDICINE
Payer: COMMERCIAL

## 2023-06-16 DIAGNOSIS — E78.2 MIXED HYPERLIPIDEMIA: ICD-10-CM

## 2023-06-16 LAB
ALBUMIN SERPL-MCNC: 3.6 G/DL (ref 3.4–5)
ALBUMIN/GLOB SERPL: 1 {RATIO} (ref 1–2)
ALP LIVER SERPL-CCNC: 68 U/L
ALT SERPL-CCNC: 29 U/L
ANION GAP SERPL CALC-SCNC: 3 MMOL/L (ref 0–18)
AST SERPL-CCNC: 23 U/L (ref 15–37)
BILIRUB SERPL-MCNC: 0.6 MG/DL (ref 0.1–2)
BUN BLD-MCNC: 14 MG/DL (ref 7–18)
CALCIUM BLD-MCNC: 8.9 MG/DL (ref 8.5–10.1)
CHLORIDE SERPL-SCNC: 104 MMOL/L (ref 98–112)
CHOLEST SERPL-MCNC: 185 MG/DL (ref ?–200)
CO2 SERPL-SCNC: 29 MMOL/L (ref 21–32)
CREAT BLD-MCNC: 0.81 MG/DL
FASTING PATIENT LIPID ANSWER: YES
FASTING STATUS PATIENT QL REPORTED: YES
GFR SERPLBLD BASED ON 1.73 SQ M-ARVRAT: 89 ML/MIN/1.73M2 (ref 60–?)
GLOBULIN PLAS-MCNC: 3.5 G/DL (ref 2.8–4.4)
GLUCOSE BLD-MCNC: 95 MG/DL (ref 70–99)
HDLC SERPL-MCNC: 64 MG/DL (ref 40–59)
LDLC SERPL CALC-MCNC: 108 MG/DL (ref ?–100)
NONHDLC SERPL-MCNC: 121 MG/DL (ref ?–130)
OSMOLALITY SERPL CALC.SUM OF ELEC: 282 MOSM/KG (ref 275–295)
POTASSIUM SERPL-SCNC: 3.9 MMOL/L (ref 3.5–5.1)
PROT SERPL-MCNC: 7.1 G/DL (ref 6.4–8.2)
SODIUM SERPL-SCNC: 136 MMOL/L (ref 136–145)
TRIGL SERPL-MCNC: 67 MG/DL (ref 30–149)
VLDLC SERPL CALC-MCNC: 11 MG/DL (ref 0–30)

## 2023-06-16 PROCEDURE — 80053 COMPREHEN METABOLIC PANEL: CPT | Performed by: FAMILY MEDICINE

## 2023-06-16 PROCEDURE — 80061 LIPID PANEL: CPT | Performed by: FAMILY MEDICINE

## 2023-08-14 DIAGNOSIS — E87.6 HYPOKALEMIA: ICD-10-CM

## 2023-08-14 RX ORDER — POTASSIUM CHLORIDE 20 MEQ/1
40 TABLET, EXTENDED RELEASE ORAL DAILY
Qty: 180 TABLET | Refills: 0 | Status: SHIPPED | OUTPATIENT
Start: 2023-08-14

## 2023-08-14 NOTE — TELEPHONE ENCOUNTER
Future appt:    Last Appointment with provider:   6/14/2023; No specific f/u recommended    Last appointment at [facility]:  6/14/2023  Cholesterol, Total (mg/dL)   Date Value   06/16/2023 185     HDL Cholesterol (mg/dL)   Date Value   06/16/2023 64 (H)     LDL Cholesterol (mg/dL)   Date Value   06/16/2023 108 (H)     Triglycerides (mg/dL)   Date Value   06/16/2023 67     No results found for: EAG, A1C  Lab Results   Component Value Date    T4F 1.1 02/20/2019    TSH 2.760 10/04/2022     Last RF:  4/10/2023    No follow-ups on file.

## 2023-09-07 RX ORDER — HYDROCHLOROTHIAZIDE 25 MG/1
TABLET ORAL
Qty: 90 TABLET | Refills: 0 | Status: SHIPPED | OUTPATIENT
Start: 2023-09-07

## 2023-09-07 NOTE — TELEPHONE ENCOUNTER
Future appt:    Last Appointment with provider:   6/14/2023; No specific f/u recommended    Last appointment at [facility]:  6/14/2023  Cholesterol, Total (mg/dL)   Date Value   06/16/2023 185     HDL Cholesterol (mg/dL)   Date Value   06/16/2023 64 (H)     LDL Cholesterol (mg/dL)   Date Value   06/16/2023 108 (H)     Triglycerides (mg/dL)   Date Value   06/16/2023 67     No results found for: EAG, A1C  Lab Results   Component Value Date    T4F 1.1 02/20/2019    TSH 2.760 10/04/2022     Last RF:  6/13/2023    No follow-ups on file.

## (undated) NOTE — MR AVS SNAPSHOT
[address]  [address] [place] [address] [id number]  [phone]               Thank you for choosing us for your health care visit with Odalys Pak MD.  We are glad to serve you and happy to provide you with this summary Assoc Dx: Benign essential hypertension [I10], Pure hypercholesterolemia [E78.00]           Comp Metabolic Panel (14) [E]    Complete by: Feb 08, 2017 (Approximate)    Assoc Dx:   Benign essential hypertension [I10], Pure hypercholesterolemia [E78.00] Expires: 4/9/2017  9:00 AM    If you have questions, you can call [phone] to talk to our [facility] Staff. Remember, [employer] is NOT to be used for urgent needs. For medical emergencies, dial 911.         Educational Information     Healthy Diet